# Patient Record
Sex: FEMALE | Race: OTHER | HISPANIC OR LATINO
[De-identification: names, ages, dates, MRNs, and addresses within clinical notes are randomized per-mention and may not be internally consistent; named-entity substitution may affect disease eponyms.]

---

## 2019-05-14 ENCOUNTER — TRANSCRIPTION ENCOUNTER (OUTPATIENT)
Age: 24
End: 2019-05-14

## 2020-05-20 ENCOUNTER — TRANSCRIPTION ENCOUNTER (OUTPATIENT)
Age: 25
End: 2020-05-20

## 2020-08-03 ENCOUNTER — EMERGENCY (EMERGENCY)
Facility: HOSPITAL | Age: 25
LOS: 1 days | Discharge: HOME | End: 2020-08-03
Attending: EMERGENCY MEDICINE
Payer: MEDICAID

## 2020-08-03 VITALS
HEIGHT: 64 IN | DIASTOLIC BLOOD PRESSURE: 81 MMHG | WEIGHT: 125 LBS | RESPIRATION RATE: 16 BRPM | HEART RATE: 88 BPM | OXYGEN SATURATION: 99 % | SYSTOLIC BLOOD PRESSURE: 102 MMHG

## 2020-08-03 VITALS — TEMPERATURE: 98 F

## 2020-08-03 LAB
ALBUMIN SERPL ELPH-MCNC: 4.6 G/DL — SIGNIFICANT CHANGE UP (ref 3.3–5.2)
ALP SERPL-CCNC: 55 U/L — SIGNIFICANT CHANGE UP (ref 40–120)
ALT FLD-CCNC: 15 U/L — SIGNIFICANT CHANGE UP
ANION GAP SERPL CALC-SCNC: 20 MMOL/L — HIGH (ref 5–17)
APPEARANCE UR: CLEAR — SIGNIFICANT CHANGE UP
AST SERPL-CCNC: 29 U/L — SIGNIFICANT CHANGE UP
BACTERIA # UR AUTO: NEGATIVE — SIGNIFICANT CHANGE UP
BASOPHILS # BLD AUTO: 0.02 K/UL — SIGNIFICANT CHANGE UP (ref 0–0.2)
BASOPHILS NFR BLD AUTO: 0.3 % — SIGNIFICANT CHANGE UP (ref 0–2)
BILIRUB SERPL-MCNC: 0.3 MG/DL — LOW (ref 0.4–2)
BILIRUB UR-MCNC: NEGATIVE — SIGNIFICANT CHANGE UP
BUN SERPL-MCNC: 12 MG/DL — SIGNIFICANT CHANGE UP (ref 8–20)
CALCIUM SERPL-MCNC: 10.2 MG/DL — SIGNIFICANT CHANGE UP (ref 8.6–10.2)
CHLORIDE SERPL-SCNC: 98 MMOL/L — SIGNIFICANT CHANGE UP (ref 98–107)
CO2 SERPL-SCNC: 19 MMOL/L — LOW (ref 22–29)
COLOR SPEC: YELLOW — SIGNIFICANT CHANGE UP
CREAT SERPL-MCNC: 0.8 MG/DL — SIGNIFICANT CHANGE UP (ref 0.5–1.3)
DIFF PNL FLD: NEGATIVE — SIGNIFICANT CHANGE UP
EOSINOPHIL # BLD AUTO: 0.01 K/UL — SIGNIFICANT CHANGE UP (ref 0–0.5)
EOSINOPHIL NFR BLD AUTO: 0.2 % — SIGNIFICANT CHANGE UP (ref 0–6)
EPI CELLS # UR: SIGNIFICANT CHANGE UP
GLUCOSE SERPL-MCNC: 170 MG/DL — HIGH (ref 70–99)
GLUCOSE UR QL: NEGATIVE MG/DL — SIGNIFICANT CHANGE UP
HCG SERPL-ACNC: <4 MIU/ML — SIGNIFICANT CHANGE UP
HCT VFR BLD CALC: 44.5 % — SIGNIFICANT CHANGE UP (ref 34.5–45)
HGB BLD-MCNC: 14.5 G/DL — SIGNIFICANT CHANGE UP (ref 11.5–15.5)
IMM GRANULOCYTES NFR BLD AUTO: 0.3 % — SIGNIFICANT CHANGE UP (ref 0–1.5)
KETONES UR-MCNC: ABNORMAL
LEUKOCYTE ESTERASE UR-ACNC: ABNORMAL
LYMPHOCYTES # BLD AUTO: 1.29 K/UL — SIGNIFICANT CHANGE UP (ref 1–3.3)
LYMPHOCYTES # BLD AUTO: 20 % — SIGNIFICANT CHANGE UP (ref 13–44)
MCHC RBC-ENTMCNC: 30.2 PG — SIGNIFICANT CHANGE UP (ref 27–34)
MCHC RBC-ENTMCNC: 32.6 GM/DL — SIGNIFICANT CHANGE UP (ref 32–36)
MCV RBC AUTO: 92.7 FL — SIGNIFICANT CHANGE UP (ref 80–100)
MONOCYTES # BLD AUTO: 0.1 K/UL — SIGNIFICANT CHANGE UP (ref 0–0.9)
MONOCYTES NFR BLD AUTO: 1.6 % — LOW (ref 2–14)
NEUTROPHILS # BLD AUTO: 5 K/UL — SIGNIFICANT CHANGE UP (ref 1.8–7.4)
NEUTROPHILS NFR BLD AUTO: 77.6 % — HIGH (ref 43–77)
NITRITE UR-MCNC: NEGATIVE — SIGNIFICANT CHANGE UP
PH UR: 5 — SIGNIFICANT CHANGE UP (ref 5–8)
PLATELET # BLD AUTO: 295 K/UL — SIGNIFICANT CHANGE UP (ref 150–400)
POTASSIUM SERPL-MCNC: 3.9 MMOL/L — SIGNIFICANT CHANGE UP (ref 3.5–5.3)
POTASSIUM SERPL-SCNC: 3.9 MMOL/L — SIGNIFICANT CHANGE UP (ref 3.5–5.3)
PROT SERPL-MCNC: 8.2 G/DL — SIGNIFICANT CHANGE UP (ref 6.6–8.7)
PROT UR-MCNC: 30 MG/DL
RBC # BLD: 4.8 M/UL — SIGNIFICANT CHANGE UP (ref 3.8–5.2)
RBC # FLD: 12.6 % — SIGNIFICANT CHANGE UP (ref 10.3–14.5)
RBC CASTS # UR COMP ASSIST: SIGNIFICANT CHANGE UP /HPF (ref 0–4)
SODIUM SERPL-SCNC: 137 MMOL/L — SIGNIFICANT CHANGE UP (ref 135–145)
SP GR SPEC: 1.02 — SIGNIFICANT CHANGE UP (ref 1.01–1.02)
UROBILINOGEN FLD QL: 1 MG/DL
WBC # BLD: 6.44 K/UL — SIGNIFICANT CHANGE UP (ref 3.8–10.5)
WBC # FLD AUTO: 6.44 K/UL — SIGNIFICANT CHANGE UP (ref 3.8–10.5)
WBC UR QL: SIGNIFICANT CHANGE UP

## 2020-08-03 PROCEDURE — 99284 EMERGENCY DEPT VISIT MOD MDM: CPT

## 2020-08-03 PROCEDURE — 83690 ASSAY OF LIPASE: CPT

## 2020-08-03 PROCEDURE — 96375 TX/PRO/DX INJ NEW DRUG ADDON: CPT

## 2020-08-03 PROCEDURE — 84702 CHORIONIC GONADOTROPIN TEST: CPT

## 2020-08-03 PROCEDURE — 36415 COLL VENOUS BLD VENIPUNCTURE: CPT

## 2020-08-03 PROCEDURE — 80053 COMPREHEN METABOLIC PANEL: CPT

## 2020-08-03 PROCEDURE — 96361 HYDRATE IV INFUSION ADD-ON: CPT

## 2020-08-03 PROCEDURE — 81001 URINALYSIS AUTO W/SCOPE: CPT

## 2020-08-03 PROCEDURE — 96374 THER/PROPH/DIAG INJ IV PUSH: CPT

## 2020-08-03 PROCEDURE — 99284 EMERGENCY DEPT VISIT MOD MDM: CPT | Mod: 25

## 2020-08-03 PROCEDURE — 85027 COMPLETE CBC AUTOMATED: CPT

## 2020-08-03 RX ORDER — ONDANSETRON 8 MG/1
1 TABLET, FILM COATED ORAL
Qty: 9 | Refills: 0
Start: 2020-08-03 | End: 2020-08-05

## 2020-08-03 RX ORDER — SODIUM CHLORIDE 9 MG/ML
1000 INJECTION INTRAMUSCULAR; INTRAVENOUS; SUBCUTANEOUS ONCE
Refills: 0 | Status: COMPLETED | OUTPATIENT
Start: 2020-08-03 | End: 2020-08-03

## 2020-08-03 RX ORDER — SODIUM CHLORIDE 9 MG/ML
1000 INJECTION INTRAMUSCULAR; INTRAVENOUS; SUBCUTANEOUS ONCE
Refills: 0 | Status: DISCONTINUED | OUTPATIENT
Start: 2020-08-03 | End: 2020-08-03

## 2020-08-03 RX ORDER — KETOROLAC TROMETHAMINE 30 MG/ML
30 SYRINGE (ML) INJECTION ONCE
Refills: 0 | Status: DISCONTINUED | OUTPATIENT
Start: 2020-08-03 | End: 2020-08-03

## 2020-08-03 RX ORDER — ONDANSETRON 8 MG/1
4 TABLET, FILM COATED ORAL ONCE
Refills: 0 | Status: COMPLETED | OUTPATIENT
Start: 2020-08-03 | End: 2020-08-03

## 2020-08-03 RX ADMIN — ONDANSETRON 4 MILLIGRAM(S): 8 TABLET, FILM COATED ORAL at 09:14

## 2020-08-03 RX ADMIN — SODIUM CHLORIDE 1000 MILLILITER(S): 9 INJECTION INTRAMUSCULAR; INTRAVENOUS; SUBCUTANEOUS at 09:16

## 2020-08-03 RX ADMIN — SODIUM CHLORIDE 1000 MILLILITER(S): 9 INJECTION INTRAMUSCULAR; INTRAVENOUS; SUBCUTANEOUS at 10:17

## 2020-08-03 RX ADMIN — Medication 30 MILLIGRAM(S): at 10:26

## 2020-08-03 NOTE — ED PROVIDER NOTE - NONTENDER LOCATION
periumbilical/left costovertebral angle/left upper quadrant/right upper quadrant/umbilical/suprapubic/right costovertebral angle

## 2020-08-03 NOTE — ED PROVIDER NOTE - CLINICAL SUMMARY MEDICAL DECISION MAKING FREE TEXT BOX
pt with hx of abx pain, similar to previous abd pain. will do labs, lipase, ivf, pain control, zofran and re-eval

## 2020-08-03 NOTE — ED PROVIDER NOTE - OBJECTIVE STATEMENT
pt is a 25yo female with pmhx of anxiety presents with abd pain x today. pt reports acute onset sharp umbilical abd pain without radiation with multiple episodes of NBNB vomiting with diarrhea. pt reports she gets abd pain at  baseline,  has been worked up multiple times by gi without acute findings (unsure what gi pt is a 25yo female with pmhx of anxiety presents with abd pain x today. pt reports acute onset sharp umbilical abd pain without radiation with multiple episodes of NBNB vomiting with diarrhea. pt reports she gets abd pain at  baseline,  has been worked up multiple times by gi without acute findings (unsure what GI). pt reports pain similar to "usual" abd pain. pt did not take anything for symptoms. pt denies fever, cp, sob, dysuria, vaginal bleeding, cough, sick contacts, recent travel. lmp 1 week ago

## 2020-08-03 NOTE — ED PROVIDER NOTE - PATIENT PORTAL LINK FT
You can access the FollowMyHealth Patient Portal offered by [facility] by registering at the following website: [email or web address]. By joining [employer]’s FollowMyHealth portal, you will also be able to view your health information using other applications (apps) compatible with our system.

## 2020-08-03 NOTE — ED PROVIDER NOTE - CARE PROVIDER_API CALL
Ana Briceno  GASTROENTEROLOGY  [address] [place] [address]  Phone: [phone]  Fax: [phone]  Follow Up Time:

## 2020-08-03 NOTE — ED ADULT NURSE NOTE - OBJECTIVE STATEMENT
pt began having mid epigastric pain associated w N/V x 1 hour.  per pt just finished her period denies fevers sweats chills denies chest pain denies difficulty breathing denies SOB, denies ROLAND

## 2020-08-03 NOTE — ED PROVIDER NOTE - PROGRESS NOTE DETAILS
pt improved. abd soft,nontender. labs reviewed. will po trial and re-eval pt improved. tolerated po crackers and soda without issue. pt advised to follow up with GI. will rx zofran. All  labs reviewed. all results reviewed with pt including abnormal results. pt given a copy of results. pt advised to follow up with pmd regarding abnormal results. All questions answered and concerns addressed. pt verbalized understanding and agreement with plan and dx. pt advised on next step and when/where to follow up. pt advised on all take home and otc medications. pt advised to follow up with PMD. pt advised to return to ed for worsenng symptoms including fever, cp, sob. will dc.

## 2020-08-03 NOTE — ED ADULT TRIAGE NOTE - CHIEF COMPLAINT QUOTE
Sharp upper abdominal pain that began this morning with vomiting and diarrhea.  Denies fever or chills.

## 2020-08-03 NOTE — ED PROVIDER NOTE - ATTENDING CONTRIBUTION TO CARE
seen with ACP  Pt with abd pain nvd  pe as documented  improved after meds with normal labs  agree with care and dispo

## 2020-10-13 NOTE — ED ADULT TRIAGE NOTE - MEANS OF ARRIVAL
Rosalia Banda(spouse) calling to get all of patient’s medications transferred from [facility] to [facility] with three 90 day refills on them. Said did not need any of these refilled at this time (will not need until another 20 days), just calling to transfer.     Callback# [phone]    stretcher

## 2020-11-16 ENCOUNTER — EMERGENCY (EMERGENCY)
Facility: HOSPITAL | Age: 25
LOS: 1 days | Discharge: HOME | End: 2020-11-16
Attending: EMERGENCY MEDICINE
Payer: MEDICAID

## 2020-11-16 VITALS
OXYGEN SATURATION: 98 % | RESPIRATION RATE: 18 BRPM | SYSTOLIC BLOOD PRESSURE: 129 MMHG | HEART RATE: 104 BPM | HEIGHT: 64 IN | TEMPERATURE: 98 F | WEIGHT: 130.07 LBS | DIASTOLIC BLOOD PRESSURE: 82 MMHG

## 2020-11-16 LAB
BASOPHILS # BLD AUTO: 0.01 K/UL — SIGNIFICANT CHANGE UP (ref 0–0.2)
BASOPHILS NFR BLD AUTO: 0.2 % — SIGNIFICANT CHANGE UP (ref 0–2)
EOSINOPHIL # BLD AUTO: 0.01 K/UL — SIGNIFICANT CHANGE UP (ref 0–0.5)
EOSINOPHIL NFR BLD AUTO: 0.2 % — SIGNIFICANT CHANGE UP (ref 0–6)
HCT VFR BLD CALC: 38.4 % — SIGNIFICANT CHANGE UP (ref 34.5–45)
HGB BLD-MCNC: 12.7 G/DL — SIGNIFICANT CHANGE UP (ref 11.5–15.5)
IMM GRANULOCYTES NFR BLD AUTO: 0.3 % — SIGNIFICANT CHANGE UP (ref 0–1.5)
LYMPHOCYTES # BLD AUTO: 1 K/UL — SIGNIFICANT CHANGE UP (ref 1–3.3)
LYMPHOCYTES # BLD AUTO: 17 % — SIGNIFICANT CHANGE UP (ref 13–44)
MCHC RBC-ENTMCNC: 30.5 PG — SIGNIFICANT CHANGE UP (ref 27–34)
MCHC RBC-ENTMCNC: 33.1 GM/DL — SIGNIFICANT CHANGE UP (ref 32–36)
MCV RBC AUTO: 92.1 FL — SIGNIFICANT CHANGE UP (ref 80–100)
MONOCYTES # BLD AUTO: 0.43 K/UL — SIGNIFICANT CHANGE UP (ref 0–0.9)
MONOCYTES NFR BLD AUTO: 7.3 % — SIGNIFICANT CHANGE UP (ref 2–14)
NEUTROPHILS # BLD AUTO: 4.4 K/UL — SIGNIFICANT CHANGE UP (ref 1.8–7.4)
NEUTROPHILS NFR BLD AUTO: 75 % — SIGNIFICANT CHANGE UP (ref 43–77)
PLATELET # BLD AUTO: 253 K/UL — SIGNIFICANT CHANGE UP (ref 150–400)
RBC # BLD: 4.17 M/UL — SIGNIFICANT CHANGE UP (ref 3.8–5.2)
RBC # FLD: 13 % — SIGNIFICANT CHANGE UP (ref 10.3–14.5)
WBC # BLD: 5.87 K/UL — SIGNIFICANT CHANGE UP (ref 3.8–10.5)
WBC # FLD AUTO: 5.87 K/UL — SIGNIFICANT CHANGE UP (ref 3.8–10.5)

## 2020-11-16 PROCEDURE — 99285 EMERGENCY DEPT VISIT HI MDM: CPT

## 2020-11-16 RX ORDER — IOHEXOL 300 MG/ML
30 INJECTION, SOLUTION INTRAVENOUS ONCE
Refills: 0 | Status: DISCONTINUED | OUTPATIENT
Start: 2020-11-16 | End: 2020-11-17

## 2020-11-16 RX ORDER — KETOROLAC TROMETHAMINE 30 MG/ML
15 SYRINGE (ML) INJECTION ONCE
Refills: 0 | Status: DISCONTINUED | OUTPATIENT
Start: 2020-11-16 | End: 2020-11-16

## 2020-11-16 RX ORDER — ONDANSETRON 8 MG/1
4 TABLET, FILM COATED ORAL ONCE
Refills: 0 | Status: COMPLETED | OUTPATIENT
Start: 2020-11-16 | End: 2020-11-16

## 2020-11-16 RX ORDER — MORPHINE SULFATE 50 MG/1
2 CAPSULE, EXTENDED RELEASE ORAL ONCE
Refills: 0 | Status: DISCONTINUED | OUTPATIENT
Start: 2020-11-16 | End: 2020-11-16

## 2020-11-16 RX ORDER — SODIUM CHLORIDE 9 MG/ML
1000 INJECTION, SOLUTION INTRAVENOUS ONCE
Refills: 0 | Status: COMPLETED | OUTPATIENT
Start: 2020-11-16 | End: 2020-11-16

## 2020-11-16 RX ADMIN — SODIUM CHLORIDE 1000 MILLILITER(S): 9 INJECTION, SOLUTION INTRAVENOUS at 23:48

## 2020-11-16 RX ADMIN — ONDANSETRON 4 MILLIGRAM(S): 8 TABLET, FILM COATED ORAL at 23:48

## 2020-11-16 NOTE — ED ADULT NURSE NOTE - CAS ELECT INFOMATION PROVIDED
DC instructions/DC instructions reviewed by SANDRA Nolasco. Pt with no apparent acute distress observed at time of discharge, ambulatory to exit. Safety maintained in ED.

## 2020-11-16 NOTE — ED ADULT TRIAGE NOTE - CHIEF COMPLAINT QUOTE
Patient is alert and oriented x3 c/o lower abd pain with n/v/d x1 day. denies fevers. denies chest pain or shortness of breath.

## 2020-11-16 NOTE — ED PROVIDER NOTE - OBJECTIVE STATEMENT
PT with no SPMHx presents to the ED with complaint of lower abd pain since this Am. Pt states that she woke up with pain that has been constat since onset. Pt states that pain is moderate feels like a dull ache that is non radiating, not improved or made worse by anything. Pt admits to interment N/V, bloody mucous diarrhea Pt states that she took Pepto at home that did not help. Pt dines fever, chills, weakness, numbness,, tingling, back pain, HA, SOB, CP, diff breathing, anal sex.

## 2020-11-16 NOTE — ED PROVIDER NOTE - CARE PROVIDER_API CALL
Moy Garcia  GASTROENTEROLOGY  [address] [place] [address]  Phone: [phone]  Fax: [phone]  Follow Up Time:

## 2020-11-16 NOTE — ED PROVIDER NOTE - CLINICAL SUMMARY MEDICAL DECISION MAKING FREE TEXT BOX
abd soft nt now nd supporitve care for diarrhea return precatutions given to pt BRAT diet advised PT with stable VS, no acute distress, non toxic appearing, tolerating PO in the ED, sig clinical improvement of symptoms after conservative medical intervention, Pt to be dc home with supportive care, BRAT diet advised, educated about when to return to the ED if needed. PT verbalizes that he understands all instructions and results. Pt informed that ED is open and available 24/7 365 days a yr, encouraged to return to the ED if they have any change in condition, or feel the need for revaluation.

## 2020-11-16 NOTE — ED PROVIDER NOTE - PATIENT PORTAL LINK FT
You can access the FollowMyHealth Patient Portal offered by [facility] by registering at the following website: [email or web address]. By joining [employer]’s FollowMyHealth portal, you will also be able to view your health information using other applications (apps) compatible with our system.

## 2020-11-16 NOTE — ED ADULT NURSE NOTE - OBJECTIVE STATEMENT
Pt received with reports of N/V/D since this morning. Pt reporting dhara red blood in stools since this morning. Pt received awake, alert, oriented x4. Respirations appearing even, unlabored.  Skin warm, dry, appropriate for race. Abdomen soft. Pt with no apparent acute distress observed at this time. Safety maintained, will continue to monitor.

## 2020-11-16 NOTE — ED PROVIDER NOTE - NS ED ROS FT
ROS: CONTUSIONAL: Denies fever, chills, fatigue, wt loss. HEAD: Denies trauma, HA, Dizziness. EYE: Denies Acute visual changes, diplopia. ENMT: Denies change in hearing, tinnitus, epistaxis, difficulty swallowing, sore throat. CARDIO: Denies CP, palpitations, edema. RESP: Denies Cough, SOB , Diff breathing, hemoptysis. GI: + N/V, ABD pain, change in bowel movement. URINARY: Denies difficulty urinating, pelvic pain. MS:  Denies joint pain, back pain, weakness, decreased ROM, swelling. NEURO: Denies change in gait, seizures, loss of sensation, dizziness, confusion LOC.  PSY: NO SI/HI.

## 2020-11-16 NOTE — ED PROVIDER NOTE - ADDITIONAL NOTES AND INSTRUCTIONS:
PT was evaluated At [facility] ED and was found to have a condition that warranted time of to rest and heal from WORK/SCHOOL.   Beau Nolasco PA-C

## 2020-11-17 LAB
ALBUMIN SERPL ELPH-MCNC: 3.8 G/DL — SIGNIFICANT CHANGE UP (ref 3.3–5.2)
ALP SERPL-CCNC: 37 U/L — LOW (ref 40–120)
ALT FLD-CCNC: 11 U/L — SIGNIFICANT CHANGE UP
ANION GAP SERPL CALC-SCNC: 13 MMOL/L — SIGNIFICANT CHANGE UP (ref 5–17)
APTT BLD: 26.3 SEC — LOW (ref 27.5–35.5)
AST SERPL-CCNC: 29 U/L — SIGNIFICANT CHANGE UP
BILIRUB SERPL-MCNC: 0.3 MG/DL — LOW (ref 0.4–2)
BLD GP AB SCN SERPL QL: SIGNIFICANT CHANGE UP
BUN SERPL-MCNC: 13 MG/DL — SIGNIFICANT CHANGE UP (ref 8–20)
CALCIUM SERPL-MCNC: 8.8 MG/DL — SIGNIFICANT CHANGE UP (ref 8.6–10.2)
CHLORIDE SERPL-SCNC: 104 MMOL/L — SIGNIFICANT CHANGE UP (ref 98–107)
CO2 SERPL-SCNC: 19 MMOL/L — LOW (ref 22–29)
CREAT SERPL-MCNC: 0.67 MG/DL — SIGNIFICANT CHANGE UP (ref 0.5–1.3)
GLUCOSE SERPL-MCNC: 116 MG/DL — HIGH (ref 70–99)
HCG SERPL-ACNC: <4 MIU/ML — SIGNIFICANT CHANGE UP
INR BLD: 1.15 RATIO — SIGNIFICANT CHANGE UP (ref 0.88–1.16)
LIDOCAIN IGE QN: 22 U/L — SIGNIFICANT CHANGE UP (ref 22–51)
POTASSIUM SERPL-MCNC: 4.4 MMOL/L — SIGNIFICANT CHANGE UP (ref 3.5–5.3)
POTASSIUM SERPL-SCNC: 4.4 MMOL/L — SIGNIFICANT CHANGE UP (ref 3.5–5.3)
PROT SERPL-MCNC: 7.4 G/DL — SIGNIFICANT CHANGE UP (ref 6.6–8.7)
PROTHROM AB SERPL-ACNC: 13.3 SEC — SIGNIFICANT CHANGE UP (ref 10.6–13.6)
SODIUM SERPL-SCNC: 136 MMOL/L — SIGNIFICANT CHANGE UP (ref 135–145)

## 2020-11-17 PROCEDURE — 86900 BLOOD TYPING SEROLOGIC ABO: CPT

## 2020-11-17 PROCEDURE — 80053 COMPREHEN METABOLIC PANEL: CPT

## 2020-11-17 PROCEDURE — 96376 TX/PRO/DX INJ SAME DRUG ADON: CPT

## 2020-11-17 PROCEDURE — 86850 RBC ANTIBODY SCREEN: CPT

## 2020-11-17 PROCEDURE — 36415 COLL VENOUS BLD VENIPUNCTURE: CPT

## 2020-11-17 PROCEDURE — 85730 THROMBOPLASTIN TIME PARTIAL: CPT

## 2020-11-17 PROCEDURE — 85025 COMPLETE CBC W/AUTO DIFF WBC: CPT

## 2020-11-17 PROCEDURE — 96374 THER/PROPH/DIAG INJ IV PUSH: CPT

## 2020-11-17 PROCEDURE — 96375 TX/PRO/DX INJ NEW DRUG ADDON: CPT

## 2020-11-17 PROCEDURE — 83690 ASSAY OF LIPASE: CPT

## 2020-11-17 PROCEDURE — 86901 BLOOD TYPING SEROLOGIC RH(D): CPT

## 2020-11-17 PROCEDURE — 84702 CHORIONIC GONADOTROPIN TEST: CPT

## 2020-11-17 PROCEDURE — 99284 EMERGENCY DEPT VISIT MOD MDM: CPT | Mod: 25

## 2020-11-17 PROCEDURE — 85610 PROTHROMBIN TIME: CPT

## 2020-11-17 RX ORDER — ONDANSETRON 8 MG/1
1 TABLET, FILM COATED ORAL
Qty: 16 | Refills: 0
Start: 2020-11-17 | End: 2020-11-20

## 2020-11-17 RX ORDER — IBUPROFEN 200 MG
1 TABLET ORAL
Qty: 20 | Refills: 0
Start: 2020-11-17 | End: 2020-11-21

## 2020-11-17 RX ORDER — FAMOTIDINE 10 MG/ML
1 INJECTION INTRAVENOUS
Qty: 14 | Refills: 0
Start: 2020-11-17 | End: 2020-11-30

## 2020-11-17 RX ORDER — LIDOCAINE 4 G/100G
10 CREAM TOPICAL ONCE
Refills: 0 | Status: DISCONTINUED | OUTPATIENT
Start: 2020-11-17 | End: 2020-11-21

## 2020-11-17 RX ORDER — ONDANSETRON 8 MG/1
4 TABLET, FILM COATED ORAL ONCE
Refills: 0 | Status: COMPLETED | OUTPATIENT
Start: 2020-11-17 | End: 2020-11-17

## 2020-11-17 RX ADMIN — Medication 15 MILLIGRAM(S): at 00:41

## 2020-11-17 RX ADMIN — ONDANSETRON 4 MILLIGRAM(S): 8 TABLET, FILM COATED ORAL at 00:41

## 2020-11-17 RX ADMIN — MORPHINE SULFATE 2 MILLIGRAM(S): 50 CAPSULE, EXTENDED RELEASE ORAL at 00:41

## 2020-11-17 RX ADMIN — Medication 30 MILLILITER(S): at 00:41

## 2022-03-24 NOTE — ED ADULT TRIAGE NOTE - BSA (M2)
What Type Of Note Output Would You Prefer (Optional)?: Bullet Format Has Your Skin Lesion Been Treated?: not been treated Is This A New Presentation, Or A Follow-Up?: Skin Lesion 1.6

## 2022-05-25 ENCOUNTER — APPOINTMENT (OUTPATIENT)
Dept: RHEUMATOLOGY | Facility: CLINIC | Age: 27
End: 2022-05-25
Payer: COMMERCIAL

## 2022-05-25 VITALS
SYSTOLIC BLOOD PRESSURE: 124 MMHG | BODY MASS INDEX: 19.97 KG/M2 | DIASTOLIC BLOOD PRESSURE: 58 MMHG | TEMPERATURE: 99.3 F | HEIGHT: 64 IN | HEART RATE: 92 BPM | OXYGEN SATURATION: 100 % | WEIGHT: 117 LBS

## 2022-05-25 PROCEDURE — 99205 OFFICE O/P NEW HI 60 MIN: CPT

## 2022-05-26 RX ORDER — METOPROLOL SUCCINATE 25 MG/1
25 TABLET, EXTENDED RELEASE ORAL DAILY
Refills: 0 | Status: ACTIVE | COMMUNITY

## 2022-05-26 NOTE — ASSESSMENT
[FreeTextEntry1] : +JADE in the setting of chronic polyarthralgias/ joint stiffness, fatigue, leukopenia, longstanding lymphadenopathy- unclear etiology. Also reports to have recent hair loss, raynauds to BL hands/ feet\par \par - labs as below\par - NSAIDS/Tylenol prn for pain (reviewed risk and benefits of medications)\par - Life style modifications reviewed. Maintain warm core body temperature. Educated about nonpharmacologic measures for treatment of raynauds. Hand/feet protection. Can consider low dose CCB if symptoms get worse.\par \par Discussed treatment plan with the patient. The patient was given the opportunity to ask questions and all questions were answered to their satisfaction.\par

## 2022-05-26 NOTE — HISTORY OF PRESENT ILLNESS
[FreeTextEntry1] : 26 year old female with PMH as listed below presents today for an initial evaluation\par \par Outside records/ labs reviewed. \par Found to have +JADE in the setting of chronic polyarthralgias, fatigue. \par She is following hem/onc for chronic hx of leukopenia, longstanding lymphadenopathy- unclear etiology. \par \par Recently over the last few months her joint pain has increased. She has pain/stiffness to her joint<<wrists, hands. Symptoms worse in AM and better with activity. \par denies swelling to the joints. \par Currently taking NSAIDs/ Tylenol prn for pain with improvement in pain. \par Denies recent illness, tick bites, trauma. \par \par +dry eyes\par +CP- related to anxiety\par +raynauds to BL hands/ feet\par +hair loss\par \par denies fever, chills,oral ulcers, blood in the urine, denies rashes, photosensitivity, denies blood clots\par \par FH: unknown\par NO hx of miscarriages. No kids\par \par

## 2022-05-26 NOTE — REVIEW OF SYSTEMS
Please send pt rx for Ciclopirox to [facility] for a PA.     [As Noted in HPI] : as noted in HPI [Negative] : Heme/Lymph

## 2022-07-20 ENCOUNTER — APPOINTMENT (OUTPATIENT)
Dept: RHEUMATOLOGY | Facility: CLINIC | Age: 27
End: 2022-07-20

## 2022-09-12 ENCOUNTER — EMERGENCY (EMERGENCY)
Facility: HOSPITAL | Age: 27
LOS: 1 days | Discharge: HOME | End: 2022-09-12
Attending: EMERGENCY MEDICINE
Payer: COMMERCIAL

## 2022-09-12 VITALS
HEIGHT: 64 IN | WEIGHT: 125.22 LBS | TEMPERATURE: 98 F | SYSTOLIC BLOOD PRESSURE: 136 MMHG | OXYGEN SATURATION: 99 % | DIASTOLIC BLOOD PRESSURE: 89 MMHG | HEART RATE: 83 BPM | RESPIRATION RATE: 18 BRPM

## 2022-09-12 LAB
ALBUMIN SERPL ELPH-MCNC: 1.4 G/DL — LOW (ref 3.3–5.2)
ALP SERPL-CCNC: 70 U/L — SIGNIFICANT CHANGE UP (ref 40–120)
ALT FLD-CCNC: 12 U/L — SIGNIFICANT CHANGE UP
ANION GAP SERPL CALC-SCNC: 7 MMOL/L — SIGNIFICANT CHANGE UP (ref 5–17)
ANISOCYTOSIS BLD QL: SLIGHT — SIGNIFICANT CHANGE UP
AST SERPL-CCNC: 28 U/L — SIGNIFICANT CHANGE UP
BASOPHILS # BLD AUTO: 0 K/UL — SIGNIFICANT CHANGE UP (ref 0–0.2)
BASOPHILS NFR BLD AUTO: 0 % — SIGNIFICANT CHANGE UP (ref 0–2)
BILIRUB SERPL-MCNC: <0.2 MG/DL — LOW (ref 0.4–2)
BUN SERPL-MCNC: 14.2 MG/DL — SIGNIFICANT CHANGE UP (ref 8–20)
CALCIUM SERPL-MCNC: 7.4 MG/DL — LOW (ref 8.4–10.5)
CHLORIDE SERPL-SCNC: 108 MMOL/L — HIGH (ref 98–107)
CO2 SERPL-SCNC: 22 MMOL/L — SIGNIFICANT CHANGE UP (ref 22–29)
CREAT SERPL-MCNC: 0.94 MG/DL — SIGNIFICANT CHANGE UP (ref 0.5–1.3)
EGFR: 86 ML/MIN/1.73M2 — SIGNIFICANT CHANGE UP
ELLIPTOCYTES BLD QL SMEAR: SIGNIFICANT CHANGE UP
EOSINOPHIL # BLD AUTO: 0.02 K/UL — SIGNIFICANT CHANGE UP (ref 0–0.5)
EOSINOPHIL NFR BLD AUTO: 0.8 % — SIGNIFICANT CHANGE UP (ref 0–6)
GLUCOSE SERPL-MCNC: 89 MG/DL — SIGNIFICANT CHANGE UP (ref 70–99)
HCG SERPL-ACNC: <4 MIU/ML — SIGNIFICANT CHANGE UP
HCT VFR BLD CALC: 32.4 % — LOW (ref 34.5–45)
HGB BLD-MCNC: 10.4 G/DL — LOW (ref 11.5–15.5)
LYMPHOCYTES # BLD AUTO: 0.67 K/UL — LOW (ref 1–3.3)
LYMPHOCYTES # BLD AUTO: 24.8 % — SIGNIFICANT CHANGE UP (ref 13–44)
MANUAL SMEAR VERIFICATION: SIGNIFICANT CHANGE UP
MCHC RBC-ENTMCNC: 30.1 PG — SIGNIFICANT CHANGE UP (ref 27–34)
MCHC RBC-ENTMCNC: 32.1 GM/DL — SIGNIFICANT CHANGE UP (ref 32–36)
MCV RBC AUTO: 93.6 FL — SIGNIFICANT CHANGE UP (ref 80–100)
MONOCYTES # BLD AUTO: 0.12 K/UL — SIGNIFICANT CHANGE UP (ref 0–0.9)
MONOCYTES NFR BLD AUTO: 4.3 % — SIGNIFICANT CHANGE UP (ref 2–14)
NEUTROPHILS # BLD AUTO: 1.85 K/UL — SIGNIFICANT CHANGE UP (ref 1.8–7.4)
NEUTROPHILS NFR BLD AUTO: 68.4 % — SIGNIFICANT CHANGE UP (ref 43–77)
OVALOCYTES BLD QL SMEAR: SIGNIFICANT CHANGE UP
PLAT MORPH BLD: NORMAL — SIGNIFICANT CHANGE UP
PLATELET # BLD AUTO: 70 K/UL — LOW (ref 150–400)
POIKILOCYTOSIS BLD QL AUTO: SIGNIFICANT CHANGE UP
POTASSIUM SERPL-MCNC: 4.7 MMOL/L — SIGNIFICANT CHANGE UP (ref 3.5–5.3)
POTASSIUM SERPL-SCNC: 4.7 MMOL/L — SIGNIFICANT CHANGE UP (ref 3.5–5.3)
PROT SERPL-MCNC: 4.9 G/DL — LOW (ref 6.6–8.7)
RBC # BLD: 3.46 M/UL — LOW (ref 3.8–5.2)
RBC # FLD: 14.9 % — HIGH (ref 10.3–14.5)
RBC BLD AUTO: ABNORMAL
SMUDGE CELLS # BLD: PRESENT — SIGNIFICANT CHANGE UP
SODIUM SERPL-SCNC: 137 MMOL/L — SIGNIFICANT CHANGE UP (ref 135–145)
VARIANT LYMPHS # BLD: 1.7 % — SIGNIFICANT CHANGE UP (ref 0–6)
WBC # BLD: 2.7 K/UL — LOW (ref 3.8–10.5)
WBC # FLD AUTO: 2.7 K/UL — LOW (ref 3.8–10.5)

## 2022-09-12 PROCEDURE — 99284 EMERGENCY DEPT VISIT MOD MDM: CPT

## 2022-09-12 PROCEDURE — 85025 COMPLETE CBC W/AUTO DIFF WBC: CPT

## 2022-09-12 PROCEDURE — 80053 COMPREHEN METABOLIC PANEL: CPT

## 2022-09-12 PROCEDURE — 99283 EMERGENCY DEPT VISIT LOW MDM: CPT

## 2022-09-12 PROCEDURE — 36415 COLL VENOUS BLD VENIPUNCTURE: CPT

## 2022-09-12 PROCEDURE — 84702 CHORIONIC GONADOTROPIN TEST: CPT

## 2022-09-12 RX ORDER — ACETAMINOPHEN 500 MG
650 TABLET ORAL ONCE
Refills: 0 | Status: COMPLETED | OUTPATIENT
Start: 2022-09-12 | End: 2022-09-12

## 2022-09-12 RX ADMIN — Medication 650 MILLIGRAM(S): at 09:18

## 2022-09-12 RX ADMIN — Medication 50 MILLIGRAM(S): at 09:18

## 2022-09-12 NOTE — ED STATDOCS - NS ED ROS FT
ROS: (+) joint pain, swelling; No fever/chills. No eye pain/changes in vision, No ear pain/sore throat/dysphagia, No chest pain/palpitations. No SOB/cough/. No abdominal pain, N/V/D, no black/bloody bm. No dysuria/frequency/discharge, No headache. No Dizziness.    No rashes or breaks in skin. No numbness/tingling/weakness.

## 2022-09-12 NOTE — ED STATDOCS - CLINICAL SUMMARY MEDICAL DECISION MAKING FREE TEXT BOX
Patient with suspected autoimmune disease with continued pain and discomfort. Will check labs, treat with steroids and Tylenol, and likely D/C with short course of steroids until she sees rheumatologist on Wednesday.

## 2022-09-12 NOTE — ED STATDOCS - PHYSICAL EXAMINATION
Gen: No acute distress, non toxic  HEENT: Mucous membranes moist, pink conjunctivae, EOMI  CV: RRR, nl s1/s2.  Resp: CTAB, normal rate and effort  GI: Abdomen soft, NT, ND. No rebound, no guarding  : No CVAT  Neuro: A&O x 3, moving all 4 extremities  MSK: No spine or joint tenderness to palpation  Skin: No rashes. intact and perfused. (+) swelling to face and fingers

## 2022-09-12 NOTE — ED STATDOCS - OBJECTIVE STATEMENT
25 y/o female with no PMHx presents with several weeks of diffuse joint pain and swelling, including swelling to her face, with low grade fever several weeks ago. Currently being worked up by Rheumatologist for autoimmune disease/Lupus, currently not on medications, but has lab work with her, has appointment on Wednesday to go over the labs. Was told she has abnormal kidney function. Was taking Meloxicam, but was told to stop taking it due to her kidneys. Labs (+) for elevated ESR, (+) JADE, and double stranded DNA antibodies.

## 2022-09-12 NOTE — ED STATDOCS - ATTENDING APP SHARED VISIT CONTRIBUTION OF CARE
Kenneth: I performed a face to face bedside interview with patient regarding history of present illness, review of symptoms and past medical history. I completed an independent physical exam and ordered tests/medications as needed.  I have discussed patient's plan of care with advanced care provider. The advanced care provider assisted in  executing the discussed plan. I was available for any questions or issues that may have arose during the execution of the plan of care.

## 2022-09-12 NOTE — ED STATDOCS - PATIENT PORTAL LINK FT
You can access the FollowMyHealth Patient Portal offered by [facility] by registering at the following website: [email or web address]. By joining [employer]’s FollowMyHealth portal, you will also be able to view your health information using other applications (apps) compatible with our system.

## 2022-09-12 NOTE — ED STATDOCS - NSFOLLOWUPINSTRUCTIONS_ED_ALL_ED_FT
Follow up with your rheumatologist this wednesday as scheduled and take the prednisone that was prescribed to you.     Please follow up with your Primary Care Doctor in 1 - 2 days. If you cannot follow-up with your primary care doctor please return to the Emergency Department for any urgent issues.  - Seek immediate medical care for any new, worsening or concerning signs or symptoms.     - If you have difficulty following up, please call: [phone] ([phone] or go to [email or web address] to obtain a [facility] doctor or specialist who takes your insurance in your area.    Continue all previously prescribed medications as directed. You can use Tylenol 650 mg every 4 hours for pain/fever. Follow up with your primary care physician in 48-72 hours- bring copies of your results.  Return to the emergency department for chest pain, shortness of breath, dizziness, or worsening, concerning, or persistent symptoms.

## 2022-09-12 NOTE — ED STATDOCS - NS ED ATTENDING STATEMENT MOD
This was a shared visit with the HEIDI. I reviewed and verified the documentation and independently performed the documented: Attending Only

## 2022-09-12 NOTE — ED ADULT TRIAGE NOTE - CHIEF COMPLAINT QUOTE
pt c/o joint pain & body aches, swelling ti face, going on for a few weeks, being worked up for LUPUS  A&Ox3, resp wnl

## 2022-09-16 ENCOUNTER — EMERGENCY (EMERGENCY)
Facility: HOSPITAL | Age: 27
LOS: 1 days | Discharge: HOME | End: 2022-09-16
Attending: EMERGENCY MEDICINE
Payer: COMMERCIAL

## 2022-09-16 VITALS
HEART RATE: 103 BPM | DIASTOLIC BLOOD PRESSURE: 68 MMHG | RESPIRATION RATE: 16 BRPM | OXYGEN SATURATION: 98 % | SYSTOLIC BLOOD PRESSURE: 132 MMHG | WEIGHT: 119.93 LBS | HEIGHT: 64 IN | TEMPERATURE: 98 F

## 2022-09-16 LAB
ALBUMIN SERPL ELPH-MCNC: 1.7 G/DL — LOW (ref 3.3–5.2)
ALP SERPL-CCNC: 81 U/L — SIGNIFICANT CHANGE UP (ref 40–120)
ALT FLD-CCNC: 23 U/L — SIGNIFICANT CHANGE UP
ANION GAP SERPL CALC-SCNC: 8 MMOL/L — SIGNIFICANT CHANGE UP (ref 5–17)
ANISOCYTOSIS BLD QL: SLIGHT — SIGNIFICANT CHANGE UP
AST SERPL-CCNC: 50 U/L — HIGH
BASOPHILS # BLD AUTO: 0 K/UL — SIGNIFICANT CHANGE UP (ref 0–0.2)
BASOPHILS NFR BLD AUTO: 0 % — SIGNIFICANT CHANGE UP (ref 0–2)
BILIRUB SERPL-MCNC: <0.2 MG/DL — LOW (ref 0.4–2)
BUN SERPL-MCNC: 22.4 MG/DL — HIGH (ref 8–20)
BURR CELLS BLD QL SMEAR: PRESENT — SIGNIFICANT CHANGE UP
CALCIUM SERPL-MCNC: 7.7 MG/DL — LOW (ref 8.4–10.5)
CHLORIDE SERPL-SCNC: 102 MMOL/L — SIGNIFICANT CHANGE UP (ref 98–107)
CO2 SERPL-SCNC: 22 MMOL/L — SIGNIFICANT CHANGE UP (ref 22–29)
CREAT SERPL-MCNC: 0.88 MG/DL — SIGNIFICANT CHANGE UP (ref 0.5–1.3)
EGFR: 93 ML/MIN/1.73M2 — SIGNIFICANT CHANGE UP
EOSINOPHIL # BLD AUTO: 0 K/UL — SIGNIFICANT CHANGE UP (ref 0–0.5)
EOSINOPHIL NFR BLD AUTO: 0 % — SIGNIFICANT CHANGE UP (ref 0–6)
GIANT PLATELETS BLD QL SMEAR: PRESENT — SIGNIFICANT CHANGE UP
GLUCOSE SERPL-MCNC: 152 MG/DL — HIGH (ref 70–99)
HCT VFR BLD CALC: 34.4 % — LOW (ref 34.5–45)
HGB BLD-MCNC: 11.1 G/DL — LOW (ref 11.5–15.5)
LYMPHOCYTES # BLD AUTO: 0.14 K/UL — LOW (ref 1–3.3)
LYMPHOCYTES # BLD AUTO: 4.4 % — LOW (ref 13–44)
MACROCYTES BLD QL: SLIGHT — SIGNIFICANT CHANGE UP
MANUAL SMEAR VERIFICATION: SIGNIFICANT CHANGE UP
MCHC RBC-ENTMCNC: 29.8 PG — SIGNIFICANT CHANGE UP (ref 27–34)
MCHC RBC-ENTMCNC: 32.3 GM/DL — SIGNIFICANT CHANGE UP (ref 32–36)
MCV RBC AUTO: 92.5 FL — SIGNIFICANT CHANGE UP (ref 80–100)
MICROCYTES BLD QL: SLIGHT — SIGNIFICANT CHANGE UP
MONOCYTES # BLD AUTO: 0.12 K/UL — SIGNIFICANT CHANGE UP (ref 0–0.9)
MONOCYTES NFR BLD AUTO: 3.5 % — SIGNIFICANT CHANGE UP (ref 2–14)
NEUTROPHILS # BLD AUTO: 3 K/UL — SIGNIFICANT CHANGE UP (ref 1.8–7.4)
NEUTROPHILS NFR BLD AUTO: 91.2 % — HIGH (ref 43–77)
OVALOCYTES BLD QL SMEAR: SIGNIFICANT CHANGE UP
PLAT MORPH BLD: NORMAL — SIGNIFICANT CHANGE UP
PLATELET # BLD AUTO: 87 K/UL — LOW (ref 150–400)
POIKILOCYTOSIS BLD QL AUTO: SIGNIFICANT CHANGE UP
POTASSIUM SERPL-MCNC: 5.7 MMOL/L — HIGH (ref 3.5–5.3)
POTASSIUM SERPL-SCNC: 5.7 MMOL/L — HIGH (ref 3.5–5.3)
PROT SERPL-MCNC: 5.3 G/DL — LOW (ref 6.6–8.7)
RBC # BLD: 3.72 M/UL — LOW (ref 3.8–5.2)
RBC # FLD: 14.8 % — HIGH (ref 10.3–14.5)
RBC BLD AUTO: ABNORMAL
SCHISTOCYTES BLD QL AUTO: SLIGHT — SIGNIFICANT CHANGE UP
SMUDGE CELLS # BLD: PRESENT — SIGNIFICANT CHANGE UP
SODIUM SERPL-SCNC: 132 MMOL/L — LOW (ref 135–145)
VARIANT LYMPHS # BLD: 0.9 % — SIGNIFICANT CHANGE UP (ref 0–6)
WBC # BLD: 3.29 K/UL — LOW (ref 3.8–10.5)
WBC # FLD AUTO: 3.29 K/UL — LOW (ref 3.8–10.5)

## 2022-09-16 PROCEDURE — 99284 EMERGENCY DEPT VISIT MOD MDM: CPT

## 2022-09-16 RX ORDER — CALCIUM CARBONATE 500(1250)
1 TABLET ORAL ONCE
Refills: 0 | Status: COMPLETED | OUTPATIENT
Start: 2022-09-16 | End: 2022-09-16

## 2022-09-16 RX ORDER — METHOCARBAMOL 500 MG/1
1 TABLET, FILM COATED ORAL
Qty: 28 | Refills: 0
Start: 2022-09-16 | End: 2022-09-22

## 2022-09-16 RX ORDER — MORPHINE SULFATE 50 MG/1
2 CAPSULE, EXTENDED RELEASE ORAL ONCE
Refills: 0 | Status: DISCONTINUED | OUTPATIENT
Start: 2022-09-16 | End: 2022-09-16

## 2022-09-16 RX ORDER — ACETAMINOPHEN 500 MG
500 TABLET ORAL ONCE
Refills: 0 | Status: COMPLETED | OUTPATIENT
Start: 2022-09-16 | End: 2022-09-16

## 2022-09-16 RX ORDER — METHOCARBAMOL 500 MG/1
500 TABLET, FILM COATED ORAL ONCE
Refills: 0 | Status: COMPLETED | OUTPATIENT
Start: 2022-09-16 | End: 2022-09-16

## 2022-09-16 RX ORDER — CALCIUM CARBONATE 500(1250)
1 TABLET ORAL
Qty: 6 | Refills: 0
Start: 2022-09-16 | End: 2022-09-18

## 2022-09-16 RX ADMIN — Medication 200 MILLIGRAM(S): at 23:00

## 2022-09-16 RX ADMIN — Medication 20 MILLIGRAM(S): at 22:46

## 2022-09-16 RX ADMIN — METHOCARBAMOL 500 MILLIGRAM(S): 500 TABLET, FILM COATED ORAL at 22:46

## 2022-09-16 NOTE — ED STATDOCS - OBJECTIVE STATEMENT
25 y/o female with PMHx Lupus presents to ED c/o joint pain. Patient reports joint pain and swelling, saw Rheumatologist 2 days ago, and gave patient 20mg of Prednisone, and can not treat the lupus due to protein in her urine. Patient is seeing nephrologist soon. Has been taking Tylenol and Flexeril at home, last dose of Tylenol at 4pm and took Prednisone at 530pm.     Denies fevers, N/V/D, abdominal pain, urinary symptoms  LNMP: started this week

## 2022-09-16 NOTE — ED ADULT NURSE NOTE - CHIEF COMPLAINT QUOTE
Ambulatory to ED c/o joint pain/aches. Per family member at bedside, patient was recently evaluated at [facility] ED, prescribed PO Prednisone w/o relief. Patient states taking Robaxin, Tylenol, Ibuprofen, around 1600 w/o relief. Patient able to ambulate at triage w/o difficulty. Patient denies back pain/burning urination.

## 2022-09-16 NOTE — ED ADULT NURSE NOTE - OBJECTIVE STATEMENT
Assumed care of pt at 2245 in [facility] A&Ox4 c/o joint pain, the pt states that she has been experiencing joint pain and swelling of the knees/ankles/hands/fingers/feet/wrists/elbows for about 2 weeks, the pt says she has been seeing a MD and was told that she may have Lupus, the pt is resting comfortably showing no signs of respiratory distress or pain, the pt is calm and cooperative, family at bedside, the pt denies N/V/D/CP/SOB, the pt states that she took Tylenol for the pain and a muscle relaxer

## 2022-09-16 NOTE — ED STATDOCS - PROGRESS NOTE DETAILS
Pt reassessed, feeling better at this time. Wants d/c Pt reassessed, pt feeling better at this time, vss, pt able to walk, talk and vocalized plan of action. Discussed in depth and explained to pt in depth the next steps that need to be taking including proper follow up with PCP or specialists. All incidental findings were discussed with pt as well. Pt verbalized their concerns and all questions were answered. Pt understands dispo and wants discharge. Given good instructions when to return to ED, strict return precautions and importance of f/u.

## 2022-09-16 NOTE — ED STATDOCS - CARE PROVIDER_API CALL
Dennis Meade)  Internal Medicine; Nephrology  [address] [place] [address]  Phone: [phone]  Fax: [phone]  Follow Up Time:     Lloyd Garnett)  Internal Medicine  [address] [place] [address]  Phone: [phone]  Fax: [phone]  Follow Up Time:

## 2022-09-16 NOTE — ED STATDOCS - NSFOLLOWUPINSTRUCTIONS_ED_ALL_ED_FT
- Please follow-up with your primary care doctor in the next 1-2 days.  Please call tomorrow for an appointment.  If you cannot follow-up with your primary care doctor please return to the ED for any urgent issues.  - Follow up with the nephrologist within 1-2 days.  - Follow up with the rheumatologist within 1-2 days.  - You were given a copy of the tests performed today.  Please bring the results with you and review them with your primary care doctor.  - If you have any worsening of symptoms or any other concerns please return to the ED immediately.  - Please continue taking your home medications as directed.

## 2022-09-16 NOTE — ED ADULT TRIAGE NOTE - CHIEF COMPLAINT QUOTE
Ambulatory to ED c/o joint pain/aches. Per family member at bedside, patient was recently evaluated at [facility] ED, prescribed PO Prednisone w/o relief. Patient states taking Robaxin, Tylenol, Ibuprofen, around 1600 w/o relief. Patient able to ambulate at triage w/o difficulty. Patient denies back pain/burning urination.

## 2022-09-16 NOTE — ED STATDOCS - CARE PROVIDERS DIRECT ADDRESSES
,[email or web address]

## 2022-09-16 NOTE — ED STATDOCS - ATTENDING APP SHARED VISIT CONTRIBUTION OF CARE
I, Julia Graham, performed the initial face to face bedside interview with this patient regarding history of present illness, review of symptoms and relevant past medical, social and family history.  I completed an independent physical examination.  I was the initial provider who evaluated this patient. I have signed out the follow up of any pending tests (i.e. labs, radiological studies) to the ACP.  I have communicated the patient’s plan of care and disposition with the ACP.  The history, relevant review of systems, past medical and surgical history, medical decision making, and physical examination was documented by the scribe in my presence and I attest to the accuracy of the documentation.

## 2022-09-16 NOTE — ED STATDOCS - CLINICAL SUMMARY MEDICAL DECISION MAKING FREE TEXT BOX
Patient with lupus, early in her diagnostic journey. will check labs, increase her prednisone from 20mg to 40mg daily, change her Flexeril to Robaxin because she has protein urea and Flexeril has an Aspirin like component, treat with IV Tylenol, and re-assess pain level.

## 2022-09-16 NOTE — ED STATDOCS - NS ED ATTENDING STATEMENT MOD
This was a shared visit with the HEIDI. I reviewed and verified the documentation and independently performed the documented:

## 2022-09-16 NOTE — ED STATDOCS - PATIENT PORTAL LINK FT
You can access the FollowMyHealth Patient Portal offered by [facility] by registering at the following website: [email or web address]. By joining [employer]’s FollowMyHealth portal, you will also be able to view your health information using other applications (apps) compatible with our system.

## 2022-09-17 VITALS
HEART RATE: 87 BPM | SYSTOLIC BLOOD PRESSURE: 121 MMHG | TEMPERATURE: 98 F | OXYGEN SATURATION: 98 % | RESPIRATION RATE: 16 BRPM | DIASTOLIC BLOOD PRESSURE: 87 MMHG

## 2022-09-17 PROCEDURE — 96375 TX/PRO/DX INJ NEW DRUG ADDON: CPT

## 2022-09-17 PROCEDURE — 36415 COLL VENOUS BLD VENIPUNCTURE: CPT

## 2022-09-17 PROCEDURE — 96374 THER/PROPH/DIAG INJ IV PUSH: CPT

## 2022-09-17 PROCEDURE — 99284 EMERGENCY DEPT VISIT MOD MDM: CPT | Mod: 25

## 2022-09-17 PROCEDURE — 80053 COMPREHEN METABOLIC PANEL: CPT

## 2022-09-17 PROCEDURE — 85025 COMPLETE CBC W/AUTO DIFF WBC: CPT

## 2022-09-17 RX ADMIN — Medication 1 TABLET(S): at 00:00

## 2022-09-17 RX ADMIN — MORPHINE SULFATE 2 MILLIGRAM(S): 50 CAPSULE, EXTENDED RELEASE ORAL at 00:00

## 2023-03-16 NOTE — ED PROVIDER NOTE - CONSTITUTIONAL NEGATIVE STATEMENT, MLM
Patient Education        Breast Self-Exam: Care Instructions  Your Care Instructions     A breast self-exam is when you check your breasts for lumps or changes. This regular exam helps you learn how your breasts normally look and feel. Most breast problems or changes are not because of cancer. Breast self-exam is not a substitute for a mammogram. Having regular breast exams by your doctor and regular mammograms improve your chances of finding any problems with your breasts. Some women set a time each month to do a step-by-step breast self-exam. Other women like a less formal system. They might look at their breasts as they brush their teeth, or feel their breasts once in a while in the shower. If you notice a change in your breast, tell your doctor. Follow-up care is a key part of your treatment and safety. Be sure to make and go to all appointments, and call your doctor if you are having problems. It's also a good idea to know your test results and keep a list of the medicines you take. How do you do a breast self-exam?  The best time to examine your breasts is usually one week after your menstrual period begins. Your breasts should not be tender then. If you do not have periods, you might do your exam on a day of the month that is easy to remember. To examine your breasts:  Remove all your clothes above the waist and lie down. When you are lying down, your breast tissue spreads evenly over your chest wall, which makes it easier to feel all your breast tissue. Use the pads--not the fingertips--of the 3 middle fingers of your left hand to check your right breast. Move your fingers slowly in small coin-sized circles that overlap. Use three levels of pressure to feel of all your breast tissue. Use light pressure to feel the tissue close to the skin surface. Use medium pressure to feel a little deeper. Use firm pressure to feel your tissue close to your breastbone and ribs.  Use each pressure level to feel your breast tissue before moving on to the next spot. Check your entire breast, moving up and down as if following a strip from the collarbone to the bra line, and from the armpit to the ribs. Repeat until you have covered the entire breast.  Repeat this procedure for your left breast, using the pads of the 3 middle fingers of your right hand. To examine your breasts while in the shower:  Place one arm over your head and lightly soap your breast on that side. Using the pads of your fingers, gently move your hand over your breast (in the strip pattern described above), feeling carefully for any lumps or changes. Repeat for the other breast.  Have your doctor inspect anything you notice to see if you need further testing. Where can you learn more? Go to http://www.woods.com/ and enter P148 to learn more about \"Breast Self-Exam: Care Instructions. \"  Current as of: August 2, 2022               Content Version: 13.5  © 2006-2022 [employer]. Care instructions adapted under license by [facility] ([facility]). If you have questions about a medical condition or this instruction, always ask your healthcare professional. [place] [address] any warranty or liability for your use of this information. no fever and no chills.

## 2023-05-17 ENCOUNTER — APPOINTMENT (OUTPATIENT)
Dept: RHEUMATOLOGY | Facility: CLINIC | Age: 28
End: 2023-05-17
Payer: MEDICAID

## 2023-05-17 VITALS
RESPIRATION RATE: 17 BRPM | SYSTOLIC BLOOD PRESSURE: 110 MMHG | WEIGHT: 112 LBS | BODY MASS INDEX: 19.12 KG/M2 | TEMPERATURE: 98 F | HEIGHT: 64 IN | DIASTOLIC BLOOD PRESSURE: 70 MMHG

## 2023-05-17 PROCEDURE — 99215 OFFICE O/P EST HI 40 MIN: CPT

## 2023-05-17 NOTE — ASSESSMENT
[FreeTextEntry1] : SLE/ Sjogrens- Labs with significantly elevated JADE, dsdna, hypocomplementemia, elevated inflammatory markers, leukopenia, proteinuria, SSA/SSB, RNP polymerase III\par \par Last seen 2022 as NPA and then lost to follow up\par Recent labs with elevated dsdna, low c3, proteinuria ( although improved)\par \par - c/w HCQ 200mg BID\par - baseline opthalmology evaluation \par - will plan to start treatment with SC Benlysta\par - will avoid MTX in child bearing age female\par - nephrology evaluation \par - reviewed risk and benefits of medications\par - encouraged compliance with medications and follow up visits\par \par Discussed treatment plan with the patient. The patient was given the opportunity to ask questions and all questions were answered to their satisfaction.\par

## 2023-05-17 NOTE — HISTORY OF PRESENT ILLNESS
[FreeTextEntry1] : Pt presenting today for a f.u visit for SLE/ sjogrens. Last seen 05/2022 and then lost to follow up. She states her insurance changed. \par Was following with another rheumatologist ( doesn’t remember name) and treated with pulse dose steroids and then started on HCQ 200mg BID. \par Had severe flare requiring hospitalization. \par Currently off prednisone. \par On HcQ 200mg BID. Tolerating medication well. Denies side effects. \par Feels her symptoms overall are much improved from LV. \par Still has intermittent rash and itching. Joint pain, oral ulcers, hair loss- improved. \par Recent labs from 05/2023 reviewed with pt\par Still with leukopenia, 3+proteinuria ( had 6+ in the past). elevated dsdna: 468, low c3, elevated esr. \par No prior kidney biopsy. Had not established care with nephrology. \par Denies fever, chills, CP, SOB, abd pain, blood in urine. On birth control.

## 2023-05-22 ENCOUNTER — NON-APPOINTMENT (OUTPATIENT)
Age: 28
End: 2023-05-22

## 2023-06-21 ENCOUNTER — APPOINTMENT (OUTPATIENT)
Dept: RHEUMATOLOGY | Facility: CLINIC | Age: 28
End: 2023-06-21
Payer: MEDICAID

## 2023-06-21 VITALS
TEMPERATURE: 97.5 F | BODY MASS INDEX: 19.63 KG/M2 | WEIGHT: 115 LBS | SYSTOLIC BLOOD PRESSURE: 120 MMHG | HEIGHT: 64 IN | RESPIRATION RATE: 17 BRPM | DIASTOLIC BLOOD PRESSURE: 84 MMHG

## 2023-06-21 LAB
ALBUMIN SERPL ELPH-MCNC: 2.5 G/DL
ALP BLD-CCNC: 45 U/L
ALT SERPL-CCNC: 10 U/L
ANA SER IF-ACNC: NEGATIVE
ANION GAP SERPL CALC-SCNC: 12 MMOL/L
AST SERPL-CCNC: 13 U/L
BILIRUB SERPL-MCNC: <0.2 MG/DL
BUN SERPL-MCNC: 31 MG/DL
C3 SERPL-MCNC: 95 MG/DL
C4 SERPL-MCNC: 24 MG/DL
CALCIUM SERPL-MCNC: 8.8 MG/DL
CCP AB SER IA-ACNC: <8 UNITS
CHLORIDE SERPL-SCNC: 102 MMOL/L
CK SERPL-CCNC: 19 U/L
CO2 SERPL-SCNC: 21 MMOL/L
CREAT SERPL-MCNC: 0.96 MG/DL
CREAT SPEC-SCNC: 45 MG/DL
CREAT/PROT UR: 6.6 RATIO
CRP SERPL-MCNC: <3 MG/L
DSDNA AB SER-ACNC: 84 IU/ML
EGFR: 84 ML/MIN/1.73M2
ENA SS-A AB SER IA-ACNC: >8 AL
ENA SS-B AB SER IA-ACNC: <0.2 AL
ERYTHROCYTE [SEDIMENTATION RATE] IN BLOOD BY WESTERGREN METHOD: 120 MM/HR
GLUCOSE SERPL-MCNC: 85 MG/DL
HAV IGM SER QL: NONREACTIVE
HBV CORE IGG+IGM SER QL: NONREACTIVE
HBV CORE IGM SER QL: NONREACTIVE
HBV E AB SER QL: NONREACTIVE
HBV E AG SER QL: NONREACTIVE
HBV SURFACE AB SER QL: REACTIVE
HBV SURFACE AG SER QL: NONREACTIVE
HCV AB SER QL: NONREACTIVE
HCV S/CO RATIO: 0.06 S/CO
HEPATITIS A IGG ANTIBODY: REACTIVE
HEPATITIS A IGG ANTIBODY: REACTIVE
HISTONE AB SER QL: 1.7 UNITS
M TB IFN-G BLD-IMP: NEGATIVE
POTASSIUM SERPL-SCNC: 4.9 MMOL/L
PROT SERPL-MCNC: 4.8 G/DL
PROT UR-MCNC: 301 MG/DL
QUANTIFERON TB PLUS MITOGEN MINUS NIL: 6.77 IU/ML
QUANTIFERON TB PLUS NIL: 0.03 IU/ML
QUANTIFERON TB PLUS TB1 MINUS NIL: 0 IU/ML
QUANTIFERON TB PLUS TB2 MINUS NIL: -0.01 IU/ML
RF+CCP IGG SER-IMP: NEGATIVE
RHEUMATOID FACT SER QL: <10 IU/ML
SODIUM SERPL-SCNC: 135 MMOL/L
TSH SERPL-ACNC: 3.1 UIU/ML

## 2023-06-21 PROCEDURE — 99215 OFFICE O/P EST HI 40 MIN: CPT

## 2023-06-21 NOTE — HISTORY OF PRESENT ILLNESS
[FreeTextEntry1] : Pt presenting today for a f.u visit for SLE/ sjogrens. Last seen 05/2023. Chart reviewed since LV.\par Labs from 06/2023 reviewed with pt in length. \par LV started on SC Benlysta. Also taking HCQ 200mg daily, prednisone 20mg daily. Tolerating medication well. Denies side effects\par Does report to have improvement in symptoms with the addition of Benlysta thus far. \par Has appointment to see nephrology next week. \par Still has intermittent rash and itching. Joint pain, oral ulcers, hair loss- improved. \par Denies fever, chills, CP, SOB, abd pain, blood in urine. On birth control. \par ROS otherwise unchanged from LV.

## 2023-06-21 NOTE — ASSESSMENT
[FreeTextEntry1] : SLE/ Sjogrens- Labs with significantly elevated JADE, dsdna, hypocomplementemia, elevated inflammatory markers, leukopenia, proteinuria, SSA/SSB, RNP polymerase III\par \par Currently with improvement in symptoms with Benlysta\par \par - repeat labs prior to follow up\par - c/w SC Benlysta\par - c/w HCQ 200mg daily. Can increase to HCQ 300mg daily based on her weight\par - taper prednisone to 10mg daily\par - opthalmology evaluation \par - will avoid MTX in child bearing age female\par - nephrology evaluation \par - reviewed risk and benefits of medications\par - encouraged compliance with medications and follow up visits\par \par Discussed treatment plan with the patient. The patient was given the opportunity to ask questions and all questions were answered to their satisfaction.\par

## 2023-06-29 ENCOUNTER — TRANSCRIPTION ENCOUNTER (OUTPATIENT)
Age: 28
End: 2023-06-29

## 2023-06-29 ENCOUNTER — APPOINTMENT (OUTPATIENT)
Dept: NEPHROLOGY | Facility: CLINIC | Age: 28
End: 2023-06-29
Payer: MEDICAID

## 2023-06-29 ENCOUNTER — NON-APPOINTMENT (OUTPATIENT)
Age: 28
End: 2023-06-29

## 2023-06-29 VITALS
HEART RATE: 133 BPM | SYSTOLIC BLOOD PRESSURE: 122 MMHG | WEIGHT: 116 LBS | OXYGEN SATURATION: 99 % | BODY MASS INDEX: 19.81 KG/M2 | DIASTOLIC BLOOD PRESSURE: 72 MMHG | HEIGHT: 64 IN

## 2023-06-29 PROCEDURE — 99203 OFFICE O/P NEW LOW 30 MIN: CPT

## 2023-06-29 NOTE — PHYSICAL EXAM
[General Appearance - Alert] : alert [General Appearance - In No Acute Distress] : in no acute distress [Sclera] : the sclera and conjunctiva were normal [Hearing Threshold Finger Rub Not [place]] : hearing was normal [Auscultation Breath Sounds / Voice Sounds] : lungs were clear to auscultation bilaterally [Heart Sounds] : normal S1 and S2 [Edema] : there was no peripheral edema [Abdomen Tenderness] : non-tender [No CVA Tenderness] : no ~M costovertebral angle tenderness [Abnormal Walk] : normal gait [] : no rash [No Focal Deficits] : no focal deficits [Oriented To Time, Place, And Person] : oriented to person, place, and time [Impaired Insight] : insight and judgment were intact

## 2023-07-11 ENCOUNTER — LABORATORY RESULT (OUTPATIENT)
Age: 28
End: 2023-07-11

## 2023-07-11 NOTE — ASSESSMENT
[FreeTextEntry1] : Nephrotic syndrome\par SLE on HCQ and benlysta\par \par \par \par Labs reviewed\par Obtain renal US\par Kidney biopsy\par \par RTC in 1 month

## 2023-07-11 NOTE — HISTORY OF PRESENT ILLNESS
[FreeTextEntry1] : 27 year old woman with PMH of SLE/ Sjogrens diagnosed last eyar in August- s/p benlysta now with improving symptoms. She is now on HCQ and Benlysta and follows with Dr. Hernandez.\par Pt referred to us for evaluation of proteinuria.\par pt denies hematuria, reports foamy urine which is improving\par also reports generalized edema back in October;was never prescribed diuretics\par Reports leg edema at the end of the day\par Denies NSAID use\par \par SH; non smoker, no Etoh/ illicit drug abuse\par Works as dental hygienist

## 2023-07-13 ENCOUNTER — APPOINTMENT (OUTPATIENT)
Dept: ULTRASOUND IMAGING | Facility: CLINIC | Age: 28
End: 2023-07-13
Payer: MEDICAID

## 2023-07-13 ENCOUNTER — OUTPATIENT (OUTPATIENT)
Dept: OUTPATIENT SERVICES | Facility: HOSPITAL | Age: 28
LOS: 1 days | End: 2023-07-13

## 2023-07-13 DIAGNOSIS — M32.9 SYSTEMIC LUPUS ERYTHEMATOSUS, UNSPECIFIED: ICD-10-CM

## 2023-07-13 PROCEDURE — 76775 US EXAM ABDO BACK WALL LIM: CPT | Mod: 26

## 2023-07-20 ENCOUNTER — NON-APPOINTMENT (OUTPATIENT)
Age: 28
End: 2023-07-20

## 2023-07-23 LAB
APPEARANCE: CLEAR
APTT BLD: 24.9 SEC
BACTERIA: ABNORMAL /HPF
BILIRUBIN URINE: NEGATIVE
BLOOD URINE: ABNORMAL
CAST: NORMAL /LPF
COLOR: YELLOW
CREAT SPEC-SCNC: 81 MG/DL
CREAT/PROT UR: 7.2 RATIO
EPITHELIAL CELLS: 2 /HPF
GLUCOSE QUALITATIVE U: NEGATIVE MG/DL
KETONES URINE: NEGATIVE MG/DL
LEUKOCYTE ESTERASE URINE: NEGATIVE
MICROSCOPIC-UA: NORMAL
NITRITE URINE: NEGATIVE
PH URINE: 6.5
PROT UR-MCNC: 581 MG/DL
PROTEIN URINE: 300 MG/DL
RED BLOOD CELLS URINE: 4 /HPF
SPECIFIC GRAVITY URINE: 1.02
UROBILINOGEN URINE: 0.2 MG/DL
WHITE BLOOD CELLS URINE: 3 /HPF

## 2023-08-01 ENCOUNTER — OUTPATIENT (OUTPATIENT)
Dept: OUTPATIENT SERVICES | Facility: HOSPITAL | Age: 28
LOS: 1 days | End: 2023-08-01
Payer: MEDICAID

## 2023-08-01 VITALS
DIASTOLIC BLOOD PRESSURE: 75 MMHG | SYSTOLIC BLOOD PRESSURE: 115 MMHG | TEMPERATURE: 97 F | OXYGEN SATURATION: 97 % | RESPIRATION RATE: 16 BRPM | WEIGHT: 123.46 LBS | HEIGHT: 64 IN | HEART RATE: 99 BPM

## 2023-08-01 DIAGNOSIS — Z01.818 ENCOUNTER FOR OTHER PREPROCEDURAL EXAMINATION: ICD-10-CM

## 2023-08-01 DIAGNOSIS — Z78.9 OTHER SPECIFIED HEALTH STATUS: ICD-10-CM

## 2023-08-01 DIAGNOSIS — Z29.9 ENCOUNTER FOR PROPHYLACTIC MEASURES, UNSPECIFIED: ICD-10-CM

## 2023-08-01 DIAGNOSIS — N63.10 UNSPECIFIED LUMP IN THE RIGHT BREAST, UNSPECIFIED QUADRANT: Chronic | ICD-10-CM

## 2023-08-01 DIAGNOSIS — N04.9 NEPHROTIC SYNDROME WITH UNSPECIFIED MORPHOLOGIC CHANGES: ICD-10-CM

## 2023-08-01 DIAGNOSIS — M32.9 SYSTEMIC LUPUS ERYTHEMATOSUS, UNSPECIFIED: ICD-10-CM

## 2023-08-01 LAB
ALBUMIN SERPL ELPH-MCNC: 2.5 G/DL — LOW (ref 3.3–5.2)
ALP SERPL-CCNC: 53 U/L — SIGNIFICANT CHANGE UP (ref 40–120)
ALT FLD-CCNC: 7 U/L — SIGNIFICANT CHANGE UP
ANION GAP SERPL CALC-SCNC: 16 MMOL/L — SIGNIFICANT CHANGE UP (ref 5–17)
APTT BLD: 25.8 SEC — SIGNIFICANT CHANGE UP (ref 24.5–35.6)
AST SERPL-CCNC: 18 U/L — SIGNIFICANT CHANGE UP
BASOPHILS # BLD AUTO: 0.03 K/UL — SIGNIFICANT CHANGE UP (ref 0–0.2)
BASOPHILS NFR BLD AUTO: 0.3 % — SIGNIFICANT CHANGE UP (ref 0–2)
BILIRUB SERPL-MCNC: <0.2 MG/DL — LOW (ref 0.4–2)
BUN SERPL-MCNC: 19.8 MG/DL — SIGNIFICANT CHANGE UP (ref 8–20)
CALCIUM SERPL-MCNC: 8.9 MG/DL — SIGNIFICANT CHANGE UP (ref 8.4–10.5)
CHLORIDE SERPL-SCNC: 103 MMOL/L — SIGNIFICANT CHANGE UP (ref 96–108)
CO2 SERPL-SCNC: 22 MMOL/L — SIGNIFICANT CHANGE UP (ref 22–29)
CREAT SERPL-MCNC: 0.94 MG/DL — SIGNIFICANT CHANGE UP (ref 0.5–1.3)
EGFR: 86 ML/MIN/1.73M2 — SIGNIFICANT CHANGE UP
EOSINOPHIL # BLD AUTO: 0.04 K/UL — SIGNIFICANT CHANGE UP (ref 0–0.5)
EOSINOPHIL NFR BLD AUTO: 0.4 % — SIGNIFICANT CHANGE UP (ref 0–6)
GLUCOSE SERPL-MCNC: 192 MG/DL — HIGH (ref 70–99)
HCG SERPL-ACNC: <4 MIU/ML — SIGNIFICANT CHANGE UP
HCT VFR BLD CALC: 29 % — LOW (ref 34.5–45)
HGB BLD-MCNC: 9.3 G/DL — LOW (ref 11.5–15.5)
IMM GRANULOCYTES NFR BLD AUTO: 1.2 % — HIGH (ref 0–0.9)
INR BLD: 0.83 RATIO — LOW (ref 0.85–1.18)
LYMPHOCYTES # BLD AUTO: 2.3 K/UL — SIGNIFICANT CHANGE UP (ref 1–3.3)
LYMPHOCYTES # BLD AUTO: 23.7 % — SIGNIFICANT CHANGE UP (ref 13–44)
MCHC RBC-ENTMCNC: 30.8 PG — SIGNIFICANT CHANGE UP (ref 27–34)
MCHC RBC-ENTMCNC: 32.1 GM/DL — SIGNIFICANT CHANGE UP (ref 32–36)
MCV RBC AUTO: 96 FL — SIGNIFICANT CHANGE UP (ref 80–100)
MONOCYTES # BLD AUTO: 0.52 K/UL — SIGNIFICANT CHANGE UP (ref 0–0.9)
MONOCYTES NFR BLD AUTO: 5.3 % — SIGNIFICANT CHANGE UP (ref 2–14)
NEUTROPHILS # BLD AUTO: 6.71 K/UL — SIGNIFICANT CHANGE UP (ref 1.8–7.4)
NEUTROPHILS NFR BLD AUTO: 69.1 % — SIGNIFICANT CHANGE UP (ref 43–77)
PLATELET # BLD AUTO: 330 K/UL — SIGNIFICANT CHANGE UP (ref 150–400)
POTASSIUM SERPL-MCNC: 5.6 MMOL/L — HIGH (ref 3.5–5.3)
POTASSIUM SERPL-SCNC: 5.6 MMOL/L — HIGH (ref 3.5–5.3)
PROT SERPL-MCNC: 5.2 G/DL — LOW (ref 6.6–8.7)
PROTHROM AB SERPL-ACNC: 9.3 SEC — LOW (ref 9.5–13)
RBC # BLD: 3.02 M/UL — LOW (ref 3.8–5.2)
RBC # FLD: 13.2 % — SIGNIFICANT CHANGE UP (ref 10.3–14.5)
SODIUM SERPL-SCNC: 141 MMOL/L — SIGNIFICANT CHANGE UP (ref 135–145)
WBC # BLD: 9.72 K/UL — SIGNIFICANT CHANGE UP (ref 3.8–10.5)
WBC # FLD AUTO: 9.72 K/UL — SIGNIFICANT CHANGE UP (ref 3.8–10.5)

## 2023-08-01 PROCEDURE — G0463: CPT

## 2023-08-01 PROCEDURE — 85610 PROTHROMBIN TIME: CPT

## 2023-08-01 PROCEDURE — 85025 COMPLETE CBC W/AUTO DIFF WBC: CPT

## 2023-08-01 PROCEDURE — 84702 CHORIONIC GONADOTROPIN TEST: CPT

## 2023-08-01 PROCEDURE — 85730 THROMBOPLASTIN TIME PARTIAL: CPT

## 2023-08-01 PROCEDURE — 80053 COMPREHEN METABOLIC PANEL: CPT

## 2023-08-01 PROCEDURE — 36415 COLL VENOUS BLD VENIPUNCTURE: CPT

## 2023-08-01 RX ORDER — SODIUM CHLORIDE 9 MG/ML
3 INJECTION INTRAMUSCULAR; INTRAVENOUS; SUBCUTANEOUS ONCE
Refills: 0 | Status: DISCONTINUED | OUTPATIENT
Start: 2023-08-10 | End: 2023-08-24

## 2023-08-01 NOTE — H&P PST ADULT - PROBLEM SELECTOR PLAN 1
Patient is scheduled for kidney biopsy in Radiologist with Dr Henry on 8/10/23  K 5.6 elevated, LM for patient to call back for repeat

## 2023-08-01 NOTE — H&P PST ADULT - ASSESSMENT
CAPRINI SCORE    AGE RELATED RISK FACTORS                                                             [ ] Age 41-60 years                                            (1 Point)  [ ] Age: 61-74 years                                           (2 Points)                 [ ] Age= 75 years                                                (3 Points)             DISEASE RELATED RISK FACTORS                                                       [ ] Edema in the lower extremities                 (1 Point)                     [ ] Varicose veins                                               (1 Point)                                 [ ] BMI > 25 Kg/m2                                            (1 Point)                                  [ ] Serious infection (ie PNA)                            (1 Point)                     [ ] Lung disease ( COPD, Emphysema)            (1 Point)                                                                          [ ] Acute myocardial infarction                         (1 Point)                  [ ] Congestive heart failure (in the previous month)  (1 Point)         [ ] Inflammatory bowel disease                            (1 Point)                  [ ] Central venous access, PICC or Port               (2 points)       (within the last month)                                                                [ ] Stroke (in the previous month)                        (5 Points)    [ ] Previous or present malignancy                       (2 points)                                                                                                                                                         HEMATOLOGY RELATED FACTORS                                                         [ ] Prior episodes of VTE                                     (3 Points)                     [ ] Positive family history for VTE                      (3 Points)                  [ ] Prothrombin 77826 A                                     (3 Points)                     [ ] Factor V Leiden                                                (3 Points)                        [ ] Lupus anticoagulants                                      (3 Points)                                                           [ ] Anticardiolipin antibodies                              (3 Points)                                                       [ ] High homocysteine in the blood                   (3 Points)                                             [ ] Other congenital or acquired thrombophilia      (3 Points)                                                [ ] Heparin induced thrombocytopenia                  (3 Points)                                        MOBILITY RELATED FACTORS  [ ] Bed rest                                                         (1 Point)  [ ] Plaster cast                                                    (2 points)  [ ] Bed bound for more than 72 hours           (2 Points)    GENDER SPECIFIC FACTORS  [ ] Pregnancy or had a baby within the last month   (1 Point)  [ ] Post-partum < 6 weeks                                   (1 Point)  [ ] Hormonal therapy  or oral contraception   (1 Point)  [ ] History of pregnancy complications              (1 point)  [ ] Unexplained or recurrent              (1 Point)    OTHER RISK FACTORS                                           (1 Point)  [ ] BMI >40, smoking, diabetes requiring insulin, chemotherapy  blood transfusions and length of surgery over 2 hours    SURGERY RELATED RISK FACTORS  [ ]  Section within the last month     (1 Point)  [ ] Minor surgery                                                  (1 Point)  [ ] Arthroscopic surgery                                       (2 Points)  [ ] Planned major surgery lasting more            (2 Points)      than 45 minutes     [ ] Elective hip or knee joint replacement       (5 points)       surgery                                                TRAUMA RELATED RISK FACTORS  [ ] Fracture of the hip, pelvis, or leg                       (5 Points)  [ ] Spinal cord injury resulting in paralysis             (5 points)       (in the previous month)    [ ] Paralysis  (less than 1 month)                             (5 Points)  [ ] Multiple Trauma within 1 month                        (5 Points)    Total Score [        ]    Caprini Score 0-2: Low Risk, NO VTE prophylaxis required for most patients, encourage ambulation  Caprini Score 3-6: Moderate Risk , pharmacologic VTE prophylaxis is indicated for most patients (in the absence of contraindications)  Caprini Score Greater than or =7: High risk, pharmocologic VTE prophylaxis indicated for most patients (in the absence of contraindications)                  OPIOID RISK TOOL    RAMIN EACH BOX THAT APPLIES AND ADD TOTALS AT THE END    FAMILY HISTORY OF SUBSTANCE ABUSE                 FEMALE         MALE                                                Alcohol                             [  ]1 pt          [  ]3pts                                               Illegal Durgs                     [  ]2 pts        [  ]3pts                                               Rx Drugs                           [  ]4 pts        [  ]4 pts    PERSONAL HISTORY OF SUBSTANCE ABUSE                                                                                          Alcohol                             [  ]3 pts       [  ]3 pts                                               Illegal Durgs                     [  ]4 pts        [  ]4 pts                                               Rx Drugs                           [  ]5 pts        [  ]5 pts    AGE BETWEEN 16-45 YEARS                                      [  ]1 pt         [  ]1 pt    HISTORY OF PREADOLESCENT   SEXUAL ABUSE                                                             [  ]3 pts        [  ]0pts    PSYCHOLOGICAL DISEASE                     ADD, OCD, Bipolar, Schizophrenia        [  ]2 pts         [  ]2 pts                      Depression                                               [  ]1 pt           [  ]1 pt           SCORING TOTAL   (add numbers and type here)              (***)                                     A score of 3 or lower indicated LOW risk for future opiod abuse  A score of 4 to 7 indicated moderate risk for future opiod abuse  A score of 8 or higher indicates a high risk for opiod abuse             CAPRINI SCORE    AGE RELATED RISK FACTORS                                                             [ ] Age 41-60 years                                            (1 Point)  [ ] Age: 61-74 years                                           (2 Points)                 [ ] Age= 75 years                                                (3 Points)             DISEASE RELATED RISK FACTORS                                                       [x ] Edema in the lower extremities                 (1 Point)                     [ ] Varicose veins                                               (1 Point)                                 [ ] BMI > 25 Kg/m2                                            (1 Point)                                  [ ] Serious infection (ie PNA)                            (1 Point)                     [ ] Lung disease ( COPD, Emphysema)            (1 Point)                                                                          [ ] Acute myocardial infarction                         (1 Point)                  [ ] Congestive heart failure (in the previous month)  (1 Point)         [ ] Inflammatory bowel disease                            (1 Point)                  [ ] Central venous access, PICC or Port               (2 points)       (within the last month)                                                                [ ] Stroke (in the previous month)                        (5 Points)    [ ] Previous or present malignancy                       (2 points)                                                                                                                                                         HEMATOLOGY RELATED FACTORS                                                         [ ] Prior episodes of VTE                                     (3 Points)                     [ ] Positive family history for VTE                      (3 Points)                  [ ] Prothrombin 37209 A                                     (3 Points)                     [ ] Factor V Leiden                                                (3 Points)                        [ ] Lupus anticoagulants                                      (3 Points)                                                           [ ] Anticardiolipin antibodies                              (3 Points)                                                       [ ] High homocysteine in the blood                   (3 Points)                                             [ ] Other congenital or acquired thrombophilia      (3 Points)                                                [ ] Heparin induced thrombocytopenia                  (3 Points)                                        MOBILITY RELATED FACTORS  [ ] Bed rest                                                         (1 Point)  [ ] Plaster cast                                                    (2 points)  [ ] Bed bound for more than 72 hours           (2 Points)    GENDER SPECIFIC FACTORS  [ ] Pregnancy or had a baby within the last month   (1 Point)  [ ] Post-partum < 6 weeks                                   (1 Point)  [ ] Hormonal therapy  or oral contraception   (1 Point)  [ ] History of pregnancy complications              (1 point)  [ ] Unexplained or recurrent              (1 Point)    OTHER RISK FACTORS                                           (1 Point)  [ ] BMI >40, smoking, diabetes requiring insulin, chemotherapy  blood transfusions and length of surgery over 2 hours    SURGERY RELATED RISK FACTORS  [ ]  Section within the last month     (1 Point)  [ ] Minor surgery                                                  (1 Point)  [ ] Arthroscopic surgery                                       (2 Points)  [x ] Planned major surgery lasting more            (2 Points)      than 45 minutes     [ ] Elective hip or knee joint replacement       (5 points)       surgery                                                TRAUMA RELATED RISK FACTORS  [ ] Fracture of the hip, pelvis, or leg                       (5 Points)  [ ] Spinal cord injury resulting in paralysis             (5 points)       (in the previous month)    [ ] Paralysis  (less than 1 month)                             (5 Points)  [ ] Multiple Trauma within 1 month                        (5 Points)    Total Score [    3    ]    Caprini Score 0-2: Low Risk, NO VTE prophylaxis required for most patients, encourage ambulation  Caprini Score 3-6: Moderate Risk , pharmacologic VTE prophylaxis is indicated for most patients (in the absence of contraindications)  Caprini Score Greater than or =7: High risk, pharmocologic VTE prophylaxis indicated for most patients (in the absence of contraindications)      OPIOID RISK TOOL    RAMIN EACH BOX THAT APPLIES AND ADD TOTALS AT THE END    FAMILY HISTORY OF SUBSTANCE ABUSE                 FEMALE         MALE                                                Alcohol                             [  ]1 pt          [  ]3pts                                               Illegal Durgs                     [  ]2 pts        [  ]3pts                                               Rx Drugs                           [  ]4 pts        [  ]4 pts    PERSONAL HISTORY OF SUBSTANCE ABUSE                                                                                          Alcohol                             [  ]3 pts       [  ]3 pts                                               Illegal Durgs                     [  ]4 pts        [  ]4 pts                                               Rx Drugs                           [  ]5 pts        [  ]5 pts    AGE BETWEEN 16-45 YEARS                                      [x  ]1 pt         [  ]1 pt    HISTORY OF PREADOLESCENT   SEXUAL ABUSE                                                             [  ]3 pts        [  ]0pts    PSYCHOLOGICAL DISEASE                     ADD, OCD, Bipolar, Schizophrenia        [  ]2 pts         [  ]2 pts                      Depression                                               [  ]1 pt           [  ]1 pt           SCORING TOTAL  1                                 A score of 3 or lower indicated LOW risk for future opiod abuse  A score of 4 to 7 indicated moderate risk for future opiod abuse  A score of 8 or higher indicates a high risk for opiod abuse    27 year old woman with PMH of SLE/ Sjogrens diagnosed 2022- currently on Benlysta and HCQ follows with Dr. Hernandez. Patient found to have proteinuria, reports LE swelling and abdominal swelling, foamy urine.  US kidneys revealed Bilateral renal caliectasis. Probable stone in the upper pole of the left kidney. Today at PST she reports abdominal swelling, but denies pain, she reports LE swelling that has improved with lasix and compression stockings. She denies fever, chills, dysuria, decrease UO or hematuria. Patient is scheduled for kidney biopsy in Radiologist with Dr Henry. Patient educated on surgical scrub, preadmission instructions, and day of procedure medications, verbalizes understanding. Pt instructed to stop vitamins/supplements/herbal medications/ASA/NSAIDS for one week prior to surgery and discuss with PMD.

## 2023-08-01 NOTE — H&P PST ADULT - NSICDXFAMILYHX_GEN_ALL_CORE_FT
FAMILY HISTORY:  Mother  Still living? Unknown  FH: type 2 diabetes, Age at diagnosis: Age Unknown    Grandparent  Still living? Unknown  FH: breast cancer, Age at diagnosis: Age Unknown  FH: type 2 diabetes, Age at diagnosis: Age Unknown

## 2023-08-01 NOTE — H&P PST ADULT - HISTORY OF PRESENT ILLNESS
27 year old woman with PMH of SLE/ Sjogrens diagnosed last eyar in August- s/p benlysta now with improving symptoms. She is now on HCQ and Benlysta and follows with Dr. Hernandez.  Pt referred to us for evaluation of proteinuria.  pt denies hematuria, reports foamy urine which is improving  also reports generalized edema back in October;was never prescribed diuretics  Reports leg edema at the end of the day  Denies NSAID use      US kidneys revealed Bilateral renal caliectasis. Probable stone in the upper pole of the left kidney.   27 year old woman with PMH of SLE/ Sjogrens diagnosed last eyar in August- s/p benlysta now with improving symptoms. She is now on HCQ and Benlysta and follows with Dr. Hernandez.  Pt referred to us for evaluation of proteinuria.  pt denies hematuria, reports foamy urine which is improving  reports discomfort when she has swelling but denies pain  denies fever, chills,   Reports abdominal swelling, LE swelling improved with lasix   Patient is scheduled for kidney biopsy       US kidneys revealed Bilateral renal caliectasis. Probable stone in the upper pole of the left kidney.   27 year old woman with PMH of SLE/ Sjogrens diagnosed August 2022- currently on Benlysta and HCQ follows with Dr. Hernandez. Patient found to have proteinuria, reports LE swelling and abdominal swelling, foamy urine.  US kidneys revealed Bilateral renal caliectasis. Probable stone in the upper pole of the left kidney. Today at PST she reports abdominal swelling, but denies pain, she reports LE swelling that has improved with lasix and compression stockings. She denies fever, chills, dysuria, decrease UO or hematuria. Patient is scheduled for kidney biopsy in Radiologist with Dr Henry.

## 2023-08-01 NOTE — H&P PST ADULT - MUSCULOSKELETAL
normal gait negative no joint swelling/no joint erythema/no calf tenderness/normal gait/strength 5/5 bilateral upper extremities/strength 5/5 bilateral lower extremities

## 2023-08-01 NOTE — H&P PST ADULT - RESPIRATORY RATE (BREATHS/MIN)
Alert-The patient is alert, awake and responds to voice. The patient is oriented to time, place, and person. The triage nurse is able to obtain subjective information. 16

## 2023-08-01 NOTE — H&P PST ADULT - NSICDXPASTMEDICALHX_GEN_ALL_CORE_FT
PAST MEDICAL HISTORY:  Nephrotic syndrome      PAST MEDICAL HISTORY:  Nephrotic syndrome     Proteinuria     SLE (systemic lupus erythematosus)

## 2023-08-02 ENCOUNTER — OUTPATIENT (OUTPATIENT)
Dept: OUTPATIENT SERVICES | Facility: HOSPITAL | Age: 28
LOS: 1 days | End: 2023-08-02
Payer: MEDICAID

## 2023-08-02 ENCOUNTER — APPOINTMENT (OUTPATIENT)
Dept: NEPHROLOGY | Facility: CLINIC | Age: 28
End: 2023-08-02
Payer: MEDICAID

## 2023-08-02 ENCOUNTER — APPOINTMENT (OUTPATIENT)
Dept: RHEUMATOLOGY | Facility: CLINIC | Age: 28
End: 2023-08-02
Payer: MEDICAID

## 2023-08-02 VITALS
BODY MASS INDEX: 21 KG/M2 | WEIGHT: 123 LBS | DIASTOLIC BLOOD PRESSURE: 74 MMHG | OXYGEN SATURATION: 97 % | SYSTOLIC BLOOD PRESSURE: 118 MMHG | HEIGHT: 64 IN | HEART RATE: 86 BPM

## 2023-08-02 VITALS
RESPIRATION RATE: 17 BRPM | DIASTOLIC BLOOD PRESSURE: 70 MMHG | OXYGEN SATURATION: 99 % | SYSTOLIC BLOOD PRESSURE: 110 MMHG | BODY MASS INDEX: 21 KG/M2 | HEART RATE: 122 BPM | HEIGHT: 64 IN | WEIGHT: 123 LBS | TEMPERATURE: 98.2 F

## 2023-08-02 DIAGNOSIS — N63.10 UNSPECIFIED LUMP IN THE RIGHT BREAST, UNSPECIFIED QUADRANT: Chronic | ICD-10-CM

## 2023-08-02 DIAGNOSIS — Z01.812 ENCOUNTER FOR PREPROCEDURAL LABORATORY EXAMINATION: ICD-10-CM

## 2023-08-02 PROBLEM — M32.9 SYSTEMIC LUPUS ERYTHEMATOSUS, UNSPECIFIED: Chronic | Status: ACTIVE | Noted: 2023-08-01

## 2023-08-02 PROBLEM — N04.9 NEPHROTIC SYNDROME WITH UNSPECIFIED MORPHOLOGIC CHANGES: Chronic | Status: ACTIVE | Noted: 2023-08-01

## 2023-08-02 PROBLEM — R80.9 PROTEINURIA, UNSPECIFIED: Chronic | Status: ACTIVE | Noted: 2023-08-01

## 2023-08-02 LAB
ALBUMIN SERPL ELPH-MCNC: 2.3 G/DL — LOW (ref 3.3–5.2)
ALP SERPL-CCNC: 52 U/L — SIGNIFICANT CHANGE UP (ref 40–120)
ALT FLD-CCNC: 7 U/L — SIGNIFICANT CHANGE UP
ANION GAP SERPL CALC-SCNC: 13 MMOL/L — SIGNIFICANT CHANGE UP (ref 5–17)
AST SERPL-CCNC: 17 U/L — SIGNIFICANT CHANGE UP
BILIRUB SERPL-MCNC: <0.2 MG/DL — LOW (ref 0.4–2)
BUN SERPL-MCNC: 19.6 MG/DL — SIGNIFICANT CHANGE UP (ref 8–20)
CALCIUM SERPL-MCNC: 8.7 MG/DL — SIGNIFICANT CHANGE UP (ref 8.4–10.5)
CHLORIDE SERPL-SCNC: 104 MMOL/L — SIGNIFICANT CHANGE UP (ref 96–108)
CO2 SERPL-SCNC: 24 MMOL/L — SIGNIFICANT CHANGE UP (ref 22–29)
CREAT SERPL-MCNC: 1.07 MG/DL — SIGNIFICANT CHANGE UP (ref 0.5–1.3)
EGFR: 73 ML/MIN/1.73M2 — SIGNIFICANT CHANGE UP
GLUCOSE SERPL-MCNC: 92 MG/DL — SIGNIFICANT CHANGE UP (ref 70–99)
POTASSIUM SERPL-MCNC: 4.9 MMOL/L — SIGNIFICANT CHANGE UP (ref 3.5–5.3)
POTASSIUM SERPL-SCNC: 4.9 MMOL/L — SIGNIFICANT CHANGE UP (ref 3.5–5.3)
PROT SERPL-MCNC: 5.3 G/DL — LOW (ref 6.6–8.7)
SODIUM SERPL-SCNC: 141 MMOL/L — SIGNIFICANT CHANGE UP (ref 135–145)

## 2023-08-02 PROCEDURE — 99214 OFFICE O/P EST MOD 30 MIN: CPT

## 2023-08-02 RX ORDER — BUPROPION HCL 150 MG
150 TABLET,SUSTAINED-RELEASE 12 HR ORAL DAILY
Refills: 0 | Status: DISCONTINUED | COMMUNITY
End: 2023-08-02

## 2023-08-02 NOTE — ASSESSMENT
[FreeTextEntry1] : SLE on HCQ and benlysta started in June Nephrotic syndrome UA with 300 protein- trace blood  Tp/cr ratio 7.2 gm <-- 6.6 Albumin levels 2.5   Renal US reviewed. Scheduled for kidney biopsy on 08/10 Anasarca improving- Continue lasix 40 mg daily  labs from PST reviwed SCr trending up change lasix to qod Obtain UA, tp/cr ratio  RTC in 1 month

## 2023-08-02 NOTE — PHYSICAL EXAM
[General Appearance - Alert] : alert [General Appearance - In No Acute Distress] : in no acute distress [Sclera] : the sclera and conjunctiva were normal [Hearing Threshold Finger Rub Not [place]] : hearing was normal [Auscultation Breath Sounds / Voice Sounds] : lungs were clear to auscultation bilaterally [Heart Sounds] : normal S1 and S2 [Edema] : there was no peripheral edema [Abdomen Tenderness] : non-tender [No CVA Tenderness] : no ~M costovertebral angle tenderness [Abnormal Walk] : normal gait [] : no rash [No Focal Deficits] : no focal deficits [Oriented To Time, Place, And Person] : oriented to person, place, and time [Impaired Insight] : insight and judgment were intact

## 2023-08-02 NOTE — HISTORY OF PRESENT ILLNESS
[FreeTextEntry1] : 27 year old woman with PMH of SLE/ Sjogrens diagnosed last eyar in August- s/p benlysta now with improving symptoms. She is now on HCQ and Benlysta and follows with Dr. Hernandez. Pt referred to us for evaluation of proteinuria. pt denies hematuria, reports foamy urine which is improving also reports generalized edema back in October;was never prescribed diuretics Reports leg edema at the end of the day Denies NSAID use  SH; non smoker, no Etoh/ illicit drug abuse Works as dental hygienist  --------------------------------------------------------  08/02 Pt presents for follow up of proteinuria. pt seen and examined; feels well. She had called our office reporting 10 lbs weight gain and worsening abdominal and leg edema and was given Lasix 40 mg daily. pt reports leg edema has resolved but she continues to have mild abdominal fullness. Her weight is now 123 down from 130.  Pt is on Benlysta weekly and on  once daily- also on prednisone taper 10 mg daily. Urine is less foamy- she is scheduled for kidney biopsy on 08/10.

## 2023-08-03 NOTE — ASSESSMENT
[FreeTextEntry1] : SLE/ Sjogrens- Labs with significantly elevated JADE, dsdna, hypocomplementemia, elevated inflammatory markers, leukopenia, proteinuria, SSA/SSB, RNP polymerase III  Currently with improvement in symptoms with Benlysta  - repeat labs prior to follow up - c/w SC Benlysta - c/w HCQ 300mg daily based on her weight - taper prednisone to 5mg daily - opthalmology evaluation  - will avoid MTX in child bearing age female - renal biopsy scheduled for 8/10 - reviewed risk and benefits of medications - encouraged compliance with medications and follow up visits  Discussed treatment plan with the patient. The patient was given the opportunity to ask questions and all questions were answered to their satisfaction.

## 2023-08-03 NOTE — HISTORY OF PRESENT ILLNESS
[FreeTextEntry1] : Pt presenting today for a f.u visit for SLE/ sjogrens. Last seen 06/2023. Chart reviewed since . Currently on SC Benlysta, HCQ 200mg daily, prednisone 10mg daily. Tolerating medication well. Denies side effects. Does report to have improvement in symptoms with the addition of Benlysta thus far.  Still has intermittent rash and itching. Joint pain, oral ulcers, hair loss- improved.  Denies fever, chills, CP, SOB, abd pain, blood in urine. On birth control.  Planned for renal biopsy 8/10.  ROS otherwise unchanged from .

## 2023-08-10 ENCOUNTER — TRANSCRIPTION ENCOUNTER (OUTPATIENT)
Age: 28
End: 2023-08-10

## 2023-08-10 ENCOUNTER — OUTPATIENT (OUTPATIENT)
Dept: OUTPATIENT SERVICES | Facility: HOSPITAL | Age: 28
LOS: 1 days | End: 2023-08-10
Payer: MEDICAID

## 2023-08-10 ENCOUNTER — RESULT REVIEW (OUTPATIENT)
Age: 28
End: 2023-08-10

## 2023-08-10 VITALS
WEIGHT: 123.02 LBS | OXYGEN SATURATION: 100 % | TEMPERATURE: 98 F | SYSTOLIC BLOOD PRESSURE: 116 MMHG | DIASTOLIC BLOOD PRESSURE: 81 MMHG | HEART RATE: 99 BPM | HEIGHT: 64 IN | RESPIRATION RATE: 16 BRPM

## 2023-08-10 DIAGNOSIS — N04.9 NEPHROTIC SYNDROME WITH UNSPECIFIED MORPHOLOGIC CHANGES: ICD-10-CM

## 2023-08-10 DIAGNOSIS — N63.10 UNSPECIFIED LUMP IN THE RIGHT BREAST, UNSPECIFIED QUADRANT: Chronic | ICD-10-CM

## 2023-08-10 LAB — HCG UR QL: NEGATIVE — SIGNIFICANT CHANGE UP

## 2023-08-10 PROCEDURE — 88348 ELECTRON MICROSCOPY DX: CPT | Mod: 26

## 2023-08-10 PROCEDURE — 88346 IMFLUOR 1ST 1ANTB STAIN PX: CPT

## 2023-08-10 PROCEDURE — 80053 COMPREHEN METABOLIC PANEL: CPT

## 2023-08-10 PROCEDURE — 76942 ECHO GUIDE FOR BIOPSY: CPT

## 2023-08-10 PROCEDURE — 88305 TISSUE EXAM BY PATHOLOGIST: CPT | Mod: 26

## 2023-08-10 PROCEDURE — 50200 RENAL BIOPSY PERQ: CPT | Mod: RT

## 2023-08-10 PROCEDURE — 88313 SPECIAL STAINS GROUP 2: CPT | Mod: 26

## 2023-08-10 PROCEDURE — 88348 ELECTRON MICROSCOPY DX: CPT

## 2023-08-10 PROCEDURE — 88350 IMFLUOR EA ADDL 1ANTB STN PX: CPT | Mod: 26

## 2023-08-10 PROCEDURE — 81025 URINE PREGNANCY TEST: CPT

## 2023-08-10 PROCEDURE — 88305 TISSUE EXAM BY PATHOLOGIST: CPT

## 2023-08-10 PROCEDURE — 36415 COLL VENOUS BLD VENIPUNCTURE: CPT

## 2023-08-10 PROCEDURE — 88313 SPECIAL STAINS GROUP 2: CPT

## 2023-08-10 PROCEDURE — 88346 IMFLUOR 1ST 1ANTB STAIN PX: CPT | Mod: 26

## 2023-08-10 PROCEDURE — 88350 IMFLUOR EA ADDL 1ANTB STN PX: CPT

## 2023-08-10 PROCEDURE — 76942 ECHO GUIDE FOR BIOPSY: CPT | Mod: 26

## 2023-08-10 RX ORDER — ACETAMINOPHEN 500 MG
650 TABLET ORAL ONCE
Refills: 0 | Status: DISCONTINUED | OUTPATIENT
Start: 2023-08-10 | End: 2023-08-24

## 2023-08-10 NOTE — ASU DISCHARGE PLAN (ADULT/PEDIATRIC) - ASU DC SPECIAL INSTRUCTIONSFT
Biopsy Discharge    Discharge Instructions  - You have had a biopsy of your right kidney.   - You may shower in 24 hours. No soaking or swimming until the site is completely healed.  - Keep the area covered and dry for the next 24 hours.  - Do not perform any heavy lifting for the next few days or until the site is healed.  - You may resume your normal diet.  - You may resume your normal medications however you should wait 48 hours before restarting aspirin, plavix, or blood thinners.  - It is normal to experience some pain over the site for the next few days. You may take apply ice to the area (20 minutes on, 20 minutes off) and take Tylenol for that pain. Do not take more frequently than every 6 hours and do not exceed more than 3000mg of Tylenol in a 24 hour period.    - You were given conscious sedation which may make you drowsy, therefore you need someone to stay with you until the morning following the procedure.  - Do not drive, engage in heavy lifting or strenuous activity, or drink any alcoholic beverages for the next 24 hours.   - You may resume normal activity in 24 hours.    Notify your primary physician and/or Interventional Radiology IMMEDIATELY if you experience any of the following       - Fever of 101F or 38C       - Chills or Rigors/ Shakes       - Swelling and/or Redness in the area around the biopsy site       - Worsening Pain       - Blood soaked bandages or worsening bleeding       - Lightheadedness and/or dizziness upon standing       - Chest Pain/ Tightness       - Shortness of Breath       - Difficulty walking    If you have a problem that you believe requires IMMEDIATE attention, please go to your NEAREST Emergency Room. If you believe your problem can safely wait until you speak to a physician, please call Interventional Radiology for any concerns.    During Normal Weekday Business Hours- You can contact the Interventional Radiology department during normal business hours via telephone.  During Evenings and Weekends- If you need to contact Interventional Radiology during off hours, do so by calling the hospital and requesting to be connected to the Interventional Radiologist on call.

## 2023-08-10 NOTE — ASU DISCHARGE PLAN (ADULT/PEDIATRIC) - NS MD DC FALL RISK RISK
For information on Fall & Injury Prevention, visit: [email or web address] OR  [email or web address] OR  https://www.cdc.gov/steadi/patient.html

## 2023-08-10 NOTE — PROGRESS NOTE ADULT - SUBJECTIVE AND OBJECTIVE BOX
IR Post Procedure Note    Diagnosis: Proteinuria    Procedure: Random Renal Biopsy    : Hernandez Lee MD    Contrast: None    Anesthesia: 1% Lidocaine Subcutaneous, Sedation administered by Anesthesiology    Estimated Blood Loss: Less than 10cc    Specimens: Specimens identified, labeled, confirmed and sent to lab. Adequacy of sample confirmed by Cytopathology Team.    Complications: Small Hematoma    Anticoagulation: Resume in 48 Hours    Findings & Plan: 3 18g core bx of Right kidney obtained w 18g needle under US guidance. Sample adequacy confirmed by cytopathology. Small hematoma on post procedure US.      Please call Interventional Radiology with any questions, concerns, or issues.

## 2023-08-17 LAB — SURGICAL PATHOLOGY STUDY: SIGNIFICANT CHANGE UP

## 2023-08-21 ENCOUNTER — RX RENEWAL (OUTPATIENT)
Age: 28
End: 2023-08-21

## 2023-08-21 ENCOUNTER — INPATIENT (INPATIENT)
Facility: HOSPITAL | Age: 28
LOS: 2 days | Discharge: HOME | DRG: 176 | End: 2023-08-24
Attending: INTERNAL MEDICINE | Admitting: HOSPITALIST
Payer: MEDICAID

## 2023-08-21 VITALS
HEART RATE: 150 BPM | WEIGHT: 123.02 LBS | HEIGHT: 64 IN | RESPIRATION RATE: 18 BRPM | TEMPERATURE: 98 F | OXYGEN SATURATION: 100 % | SYSTOLIC BLOOD PRESSURE: 117 MMHG | DIASTOLIC BLOOD PRESSURE: 79 MMHG

## 2023-08-21 DIAGNOSIS — I26.99 OTHER PULMONARY EMBOLISM WITHOUT ACUTE COR PULMONALE: ICD-10-CM

## 2023-08-21 DIAGNOSIS — N63.10 UNSPECIFIED LUMP IN THE RIGHT BREAST, UNSPECIFIED QUADRANT: Chronic | ICD-10-CM

## 2023-08-21 LAB
ALBUMIN SERPL ELPH-MCNC: 2.2 G/DL — LOW (ref 3.3–5.2)
ALP SERPL-CCNC: 64 U/L — SIGNIFICANT CHANGE UP (ref 40–120)
ALT FLD-CCNC: 7 U/L — SIGNIFICANT CHANGE UP
ANION GAP SERPL CALC-SCNC: 16 MMOL/L — SIGNIFICANT CHANGE UP (ref 5–17)
APTT BLD: 29.4 SEC — SIGNIFICANT CHANGE UP (ref 24.5–35.6)
AST SERPL-CCNC: 17 U/L — SIGNIFICANT CHANGE UP
BASOPHILS # BLD AUTO: 0.03 K/UL — SIGNIFICANT CHANGE UP (ref 0–0.2)
BASOPHILS NFR BLD AUTO: 0.2 % — SIGNIFICANT CHANGE UP (ref 0–2)
BILIRUB SERPL-MCNC: <0.2 MG/DL — LOW (ref 0.4–2)
BUN SERPL-MCNC: 17.7 MG/DL — SIGNIFICANT CHANGE UP (ref 8–20)
CALCIUM SERPL-MCNC: 8.8 MG/DL — SIGNIFICANT CHANGE UP (ref 8.4–10.5)
CHLORIDE SERPL-SCNC: 101 MMOL/L — SIGNIFICANT CHANGE UP (ref 96–108)
CO2 SERPL-SCNC: 22 MMOL/L — SIGNIFICANT CHANGE UP (ref 22–29)
CREAT SERPL-MCNC: 0.91 MG/DL — SIGNIFICANT CHANGE UP (ref 0.5–1.3)
EGFR: 89 ML/MIN/1.73M2 — SIGNIFICANT CHANGE UP
EOSINOPHIL # BLD AUTO: 0.03 K/UL — SIGNIFICANT CHANGE UP (ref 0–0.5)
EOSINOPHIL NFR BLD AUTO: 0.2 % — SIGNIFICANT CHANGE UP (ref 0–6)
GLUCOSE SERPL-MCNC: 91 MG/DL — SIGNIFICANT CHANGE UP (ref 70–99)
HCG SERPL-ACNC: <4 MIU/ML — SIGNIFICANT CHANGE UP
HCT VFR BLD CALC: 27.3 % — LOW (ref 34.5–45)
HGB BLD-MCNC: 8.9 G/DL — LOW (ref 11.5–15.5)
IMM GRANULOCYTES NFR BLD AUTO: 0.8 % — SIGNIFICANT CHANGE UP (ref 0–0.9)
INR BLD: 0.95 RATIO — SIGNIFICANT CHANGE UP (ref 0.85–1.18)
LIDOCAIN IGE QN: 23 U/L — SIGNIFICANT CHANGE UP (ref 22–51)
LYMPHOCYTES # BLD AUTO: 15.5 % — SIGNIFICANT CHANGE UP (ref 13–44)
LYMPHOCYTES # BLD AUTO: 2.1 K/UL — SIGNIFICANT CHANGE UP (ref 1–3.3)
MAGNESIUM SERPL-MCNC: 1.4 MG/DL — LOW (ref 1.6–2.6)
MCHC RBC-ENTMCNC: 30 PG — SIGNIFICANT CHANGE UP (ref 27–34)
MCHC RBC-ENTMCNC: 32.6 GM/DL — SIGNIFICANT CHANGE UP (ref 32–36)
MCV RBC AUTO: 91.9 FL — SIGNIFICANT CHANGE UP (ref 80–100)
MONOCYTES # BLD AUTO: 0.72 K/UL — SIGNIFICANT CHANGE UP (ref 0–0.9)
MONOCYTES NFR BLD AUTO: 5.3 % — SIGNIFICANT CHANGE UP (ref 2–14)
NEUTROPHILS # BLD AUTO: 10.6 K/UL — HIGH (ref 1.8–7.4)
NEUTROPHILS NFR BLD AUTO: 78 % — HIGH (ref 43–77)
NT-PROBNP SERPL-SCNC: 919 PG/ML — HIGH (ref 0–300)
PLATELET # BLD AUTO: 395 K/UL — SIGNIFICANT CHANGE UP (ref 150–400)
POTASSIUM SERPL-MCNC: 4.3 MMOL/L — SIGNIFICANT CHANGE UP (ref 3.5–5.3)
POTASSIUM SERPL-SCNC: 4.3 MMOL/L — SIGNIFICANT CHANGE UP (ref 3.5–5.3)
PROT SERPL-MCNC: 5.8 G/DL — LOW (ref 6.6–8.7)
PROTHROM AB SERPL-ACNC: 10.6 SEC — SIGNIFICANT CHANGE UP (ref 9.5–13)
RBC # BLD: 2.97 M/UL — LOW (ref 3.8–5.2)
RBC # FLD: 12.5 % — SIGNIFICANT CHANGE UP (ref 10.3–14.5)
SODIUM SERPL-SCNC: 139 MMOL/L — SIGNIFICANT CHANGE UP (ref 135–145)
TROPONIN T SERPL-MCNC: <0.01 NG/ML — SIGNIFICANT CHANGE UP (ref 0–0.06)
TROPONIN T SERPL-MCNC: <0.01 NG/ML — SIGNIFICANT CHANGE UP (ref 0–0.06)
WBC # BLD: 13.59 K/UL — HIGH (ref 3.8–10.5)
WBC # FLD AUTO: 13.59 K/UL — HIGH (ref 3.8–10.5)

## 2023-08-21 PROCEDURE — 71275 CT ANGIOGRAPHY CHEST: CPT | Mod: 26,MA

## 2023-08-21 PROCEDURE — 99233 SBSQ HOSP IP/OBS HIGH 50: CPT

## 2023-08-21 PROCEDURE — 99223 1ST HOSP IP/OBS HIGH 75: CPT

## 2023-08-21 PROCEDURE — 74177 CT ABD & PELVIS W/CONTRAST: CPT | Mod: 26,MG

## 2023-08-21 PROCEDURE — 71045 X-RAY EXAM CHEST 1 VIEW: CPT | Mod: 26

## 2023-08-21 PROCEDURE — 99291 CRITICAL CARE FIRST HOUR: CPT

## 2023-08-21 PROCEDURE — G1004: CPT

## 2023-08-21 RX ORDER — SPIRONOLACTONE 25 MG/1
1 TABLET, FILM COATED ORAL
Refills: 0 | DISCHARGE

## 2023-08-21 RX ORDER — CEFTRIAXONE 500 MG/1
1000 INJECTION, POWDER, FOR SOLUTION INTRAMUSCULAR; INTRAVENOUS ONCE
Refills: 0 | Status: DISCONTINUED | OUTPATIENT
Start: 2023-08-21 | End: 2023-08-21

## 2023-08-21 RX ORDER — ACETAMINOPHEN 500 MG
650 TABLET ORAL EVERY 6 HOURS
Refills: 0 | Status: DISCONTINUED | OUTPATIENT
Start: 2023-08-21 | End: 2023-08-24

## 2023-08-21 RX ORDER — ASPIRIN/CALCIUM CARB/MAGNESIUM 324 MG
324 TABLET ORAL ONCE
Refills: 0 | Status: COMPLETED | OUTPATIENT
Start: 2023-08-21 | End: 2023-08-21

## 2023-08-21 RX ORDER — MORPHINE SULFATE 50 MG/1
2 CAPSULE, EXTENDED RELEASE ORAL
Refills: 0 | Status: DISCONTINUED | OUTPATIENT
Start: 2023-08-21 | End: 2023-08-24

## 2023-08-21 RX ORDER — SODIUM CHLORIDE 9 MG/ML
1000 INJECTION INTRAMUSCULAR; INTRAVENOUS; SUBCUTANEOUS ONCE
Refills: 0 | Status: COMPLETED | OUTPATIENT
Start: 2023-08-21 | End: 2023-08-21

## 2023-08-21 RX ORDER — HEPARIN SODIUM 5000 [USP'U]/ML
2000 INJECTION INTRAVENOUS; SUBCUTANEOUS EVERY 6 HOURS
Refills: 0 | Status: DISCONTINUED | OUTPATIENT
Start: 2023-08-21 | End: 2023-08-24

## 2023-08-21 RX ORDER — HYDROXYCHLOROQUINE SULFATE 200 MG
1 TABLET ORAL
Refills: 0 | DISCHARGE

## 2023-08-21 RX ORDER — HEPARIN SODIUM 5000 [USP'U]/ML
INJECTION INTRAVENOUS; SUBCUTANEOUS
Qty: 25000 | Refills: 0 | Status: DISCONTINUED | OUTPATIENT
Start: 2023-08-21 | End: 2023-08-22

## 2023-08-21 RX ORDER — CEFTRIAXONE 500 MG/1
1000 INJECTION, POWDER, FOR SOLUTION INTRAMUSCULAR; INTRAVENOUS ONCE
Refills: 0 | Status: COMPLETED | OUTPATIENT
Start: 2023-08-21 | End: 2023-08-21

## 2023-08-21 RX ORDER — MAGNESIUM SULFATE 500 MG/ML
2 VIAL (ML) INJECTION ONCE
Refills: 0 | Status: COMPLETED | OUTPATIENT
Start: 2023-08-21 | End: 2023-08-21

## 2023-08-21 RX ORDER — LANOLIN ALCOHOL/MO/W.PET/CERES
3 CREAM (GRAM) TOPICAL AT BEDTIME
Refills: 0 | Status: DISCONTINUED | OUTPATIENT
Start: 2023-08-21 | End: 2023-08-24

## 2023-08-21 RX ORDER — HEPARIN SODIUM 5000 [USP'U]/ML
4500 INJECTION INTRAVENOUS; SUBCUTANEOUS ONCE
Refills: 0 | Status: COMPLETED | OUTPATIENT
Start: 2023-08-21 | End: 2023-08-21

## 2023-08-21 RX ORDER — FUROSEMIDE 40 MG
1 TABLET ORAL
Refills: 0 | DISCHARGE

## 2023-08-21 RX ORDER — AZITHROMYCIN 500 MG/1
500 TABLET, FILM COATED ORAL ONCE
Refills: 0 | Status: COMPLETED | OUTPATIENT
Start: 2023-08-21 | End: 2023-08-21

## 2023-08-21 RX ORDER — HEPARIN SODIUM 5000 [USP'U]/ML
4500 INJECTION INTRAVENOUS; SUBCUTANEOUS EVERY 6 HOURS
Refills: 0 | Status: DISCONTINUED | OUTPATIENT
Start: 2023-08-21 | End: 2023-08-24

## 2023-08-21 RX ORDER — SERTRALINE 25 MG/1
1 TABLET, FILM COATED ORAL
Refills: 0 | DISCHARGE

## 2023-08-21 RX ORDER — ONDANSETRON 8 MG/1
4 TABLET, FILM COATED ORAL EVERY 8 HOURS
Refills: 0 | Status: DISCONTINUED | OUTPATIENT
Start: 2023-08-21 | End: 2023-08-24

## 2023-08-21 RX ORDER — TRAMADOL HYDROCHLORIDE 50 MG/1
50 TABLET ORAL EVERY 6 HOURS
Refills: 0 | Status: DISCONTINUED | OUTPATIENT
Start: 2023-08-21 | End: 2023-08-24

## 2023-08-21 RX ORDER — METOPROLOL TARTRATE 50 MG
1 TABLET ORAL
Refills: 0 | DISCHARGE

## 2023-08-21 RX ADMIN — SODIUM CHLORIDE 1000 MILLILITER(S): 9 INJECTION INTRAMUSCULAR; INTRAVENOUS; SUBCUTANEOUS at 16:57

## 2023-08-21 RX ADMIN — TRAMADOL HYDROCHLORIDE 50 MILLIGRAM(S): 50 TABLET ORAL at 23:49

## 2023-08-21 RX ADMIN — Medication 324 MILLIGRAM(S): at 16:57

## 2023-08-21 RX ADMIN — ONDANSETRON 4 MILLIGRAM(S): 8 TABLET, FILM COATED ORAL at 23:49

## 2023-08-21 RX ADMIN — CEFTRIAXONE 1000 MILLIGRAM(S): 500 INJECTION, POWDER, FOR SOLUTION INTRAMUSCULAR; INTRAVENOUS at 22:40

## 2023-08-21 RX ADMIN — AZITHROMYCIN 255 MILLIGRAM(S): 500 TABLET, FILM COATED ORAL at 22:50

## 2023-08-21 RX ADMIN — HEPARIN SODIUM 4500 UNIT(S): 5000 INJECTION INTRAVENOUS; SUBCUTANEOUS at 21:37

## 2023-08-21 RX ADMIN — Medication 25 GRAM(S): at 21:23

## 2023-08-21 RX ADMIN — HEPARIN SODIUM 1100 UNIT(S)/HR: 5000 INJECTION INTRAVENOUS; SUBCUTANEOUS at 21:35

## 2023-08-21 NOTE — ED PROVIDER NOTE - ATTENDING CONTRIBUTION TO CARE
I, Amaya Sims DO, have personally provided 60 minutes of critical care time exclusive of time spent on separately billable procedures. Time includes review of laboratory data, radiology results, discussion with consultants, and monitoring for potential decompensation. Interventions were performed as documented above.     I personally saw the patient with the resident, and completed the key components of the history and physical exam. I then discussed the management plan with the resident.    26 y/o F with PMH SLE, nephrotic syndrome (s/p renal bx 2 weeks ago) presents for sudden onset chest pain with SOB that is worse with exertion, improved with rest. She felt right flank pain left week that has gradually worsened at the site of the bx, but previously had no pain in that area. Denies fevers, chills, nausea, vomiting, abdominal pain, dysuria, hematuria, coughing, LE edema. She is on steroids for her SLE, as well as Benlysta weekly. She does take OCPs.    PE - NAD, well appearing, tachycardic, lungs CTA b/L, abd soft, NT/ND, no CVAT, no LE edema, 2+ symmetrical distal pulses.    Patient high risk for PE - will evaluate for PE with CTA, will also obtain CT A/P, labs and reassess.

## 2023-08-21 NOTE — CONSULT NOTE ADULT - ASSESSMENT
26 y/o F with PMH Lupus/Sjogrens, on oral contraceptives, sinus tachycardia (takes metoprolol baseline, HR usually 120s), Family hx of blood clots who presents to [facility] ED with SOB x 2-3 days with chest pain and severe LE edema starting about 1 month ago. Found with PE, started on heparin GTT, plan to TTE and LE dopplers. NPO after midnight in case of need for further intervention.
28 y/o F with a h/o SLE (on chronic steroids), nephrotic syndrome (s/p right renal biopsy 2 weeks prior), chronic sinus tachycardia (on metoprolol), with:    # Bilateral pulmonary emboli  # IVC thrombus  # Lobar pneumonia  # Right renal hematoma    Patient does not require MICU level of care at this time. Please reconsult as appropriate if her condition should deteriorate.    PE likely provoked by SLE + oral contraceptive use. Currently tolerating room air without work of breathing. BP stable. Sinus tachycardic (HR 110s), however this is reportedly chronic. Possible clot in transit visualized in IVC. Start full anticoagulation with heparin infusion. Ideally would like to avoid thrombolytic given small right kidney hematoma related to recent biopsy, however if she were to experience severe clinical and/or hemodynamic deterioration would administer emergently given risk/benefit profile. Monitor hemodynamics closely, maintain a MAP > 65.    No evidence of RV strain on CT-imaging. Troponin negative x 2, although pro-BNP elevated 900+. F/u TTE to definitively assess RV function. Cardiology input appreciated. Obtain bilateral lower extremity venous dopplers to r/o DVT.    Would start empiric CAP antibiotic coverage for LLL consolidation.      Case discussed with the patient and her mother at the bedside. Diagnosis, prognosis, and management plan outlined. All questions answered and concerns addressed.    Case discussed with MICU physician, Dr. Barajas.      75 minutes accounts for the total time spent on this patient encounter, which includes assessing and addressing the problems and their associated risks as mentioned above, reviewing the medical record/laboratory data/imaging studies, interviewing and physically examining the patient, as well as coordinating care with the multidisciplinary team.

## 2023-08-21 NOTE — ED ADULT NURSE NOTE - OBJECTIVE STATEMENT
PT presents to ED c/o pleuritic chest pain x about 1 week.  Pt with hx Lupus recently had kidney biopsy 8/10 c/o pain to right flank radiating to right shoulder. Spoke with surgeon and nephro sent to ED for eval.  PT states pain worse with walking and deep inspiration. Tachycardia in ED. PT states last lupus flare 3 months ago, stating she is still on steroid taper.  5mg Prednisone at this time. IV placed labs drawn pending results and imaging.

## 2023-08-21 NOTE — CONSULT NOTE ADULT - SUBJECTIVE AND OBJECTIVE BOX
Patient is a 27y old  Female who presents with a chief complaint of     BRIEF HOSPITAL COURSE: 26 y/o F with a h/o SLE (on chronic steroids), nephrotic syndrome (s/p right renal biopsy 2 weeks prior), chronic sinus tachycardia (on metoprolol), presents to the ED with complaints of substernal chest pain and dyspnea over the past few days. CTA C/A/P reveals bilateral segmental PE (L>>R, bilateral lower lobes) with suspected life sided pulmonary infarct, LLL pneumonia, IVC thrombus, and small right renal hematoma. Of note, she is on oral contraceptives.          PAST MEDICAL & SURGICAL HISTORY:  Nephrotic syndrome      SLE (systemic lupus erythematosus)      Proteinuria      Breast mass, right        Allergies    No Known Allergies    Intolerances      FAMILY HISTORY:  FH: type 2 diabetes (Mother, Grandparent)    FH: breast cancer (Grandparent)        Review of Systems:  CONSTITUTIONAL: No fever, chills, or fatigue  EYES: No eye pain, visual disturbances, or discharge  ENMT:  No difficulty hearing, tinnitus, vertigo; No sinus or throat pain  NECK: No pain or stiffness  RESPIRATORY: No cough, wheezing, chills or hemoptysis; (+) shortness of breath  CARDIOVASCULAR: (+) chest pain, no palpitations, dizziness, or leg swelling  GASTROINTESTINAL: No abdominal or epigastric pain. No nausea, vomiting, or hematemesis; No diarrhea or constipation. No melena or hematochezia.  GENITOURINARY: No dysuria, frequency, hematuria, or incontinence  NEUROLOGICAL: No headaches, memory loss, loss of strength, numbness, or tremors  SKIN: No itching, burning, rashes, or lesions   MUSCULOSKELETAL: No joint pain or swelling; No muscle, back, or extremity pain  PSYCHIATRIC: No depression, anxiety, mood swings, or difficulty sleeping        Medications:  azithromycin  IVPB 500 milliGRAM(s) IV Intermittent once  cefTRIAXone Injectable. 1000 milliGRAM(s) IV Push once  heparin   Injectable 4500 Unit(s) IV Push every 6 hours PRN  heparin   Injectable 2000 Unit(s) IV Push every 6 hours PRN  heparin  Infusion.  Unit(s)/Hr IV Continuous <Continuous>      ICU Vital Signs Last 24 Hrs  T(C): 37.1 (21 Aug 2023 16:20), Max: 37.1 (21 Aug 2023 16:20)  T(F): 98.8 (21 Aug 2023 16:20), Max: 98.8 (21 Aug 2023 16:20)  HR: 104 (21 Aug 2023 16:20) (104 - 150)  BP: 105/70 (21 Aug 2023 16:20) (105/70 - 117/79)  BP(mean): --  ABP: --  ABP(mean): --  RR: 18 (21 Aug 2023 16:20) (18 - 18)  SpO2: 100% (21 Aug 2023 16:20) (100% - 100%)    O2 Parameters below as of 21 Aug 2023 16:20  Patient On (Oxygen Delivery Method): room air          Vital Signs Last 24 Hrs  T(C): 37.1 (21 Aug 2023 16:20), Max: 37.1 (21 Aug 2023 16:20)  T(F): 98.8 (21 Aug 2023 16:20), Max: 98.8 (21 Aug 2023 16:20)  HR: 104 (21 Aug 2023 16:20) (104 - 150)  BP: 105/70 (21 Aug 2023 16:20) (105/70 - 117/79)  BP(mean): --  RR: 18 (21 Aug 2023 16:20) (18 - 18)  SpO2: 100% (21 Aug 2023 16:20) (100% - 100%)    Parameters below as of 21 Aug 2023 16:20  Patient On (Oxygen Delivery Method): room air            I&O's Detail        LABS:                        8.9    13.59 )-----------( 395      ( 21 Aug 2023 14:15 )             27.3     08-21    139  |  101  |  17.7  ----------------------------<  91  4.3   |  22.0  |  0.91    Ca    8.8      21 Aug 2023 14:15  Mg     1.4     08-21    TPro  5.8<L>  /  Alb  2.2<L>  /  TBili  <0.2<L>  /  DBili  x   /  AST  17  /  ALT  7   /  AlkPhos  64  08-21      CARDIAC MARKERS ( 21 Aug 2023 21:30 )  x     / <0.01 ng/mL / x     / x     / x      CARDIAC MARKERS ( 21 Aug 2023 14:15 )  x     / <0.01 ng/mL / x     / x     / x          CAPILLARY BLOOD GLUCOSE        PT/INR - ( 21 Aug 2023 14:15 )   PT: 10.6 sec;   INR: 0.95 ratio         PTT - ( 21 Aug 2023 14:15 )  PTT:29.4 sec  Urinalysis Basic - ( 21 Aug 2023 14:15 )    Color: x / Appearance: x / SG: x / pH: x  Gluc: 91 mg/dL / Ketone: x  / Bili: x / Urobili: x   Blood: x / Protein: x / Nitrite: x   Leuk Esterase: x / RBC: x / WBC x   Sq Epi: x / Non Sq Epi: x / Bacteria: x      CULTURES:        Physical Examination:    General: No acute distress.  Alert, oriented, interactive, nonfocal    HEENT: Pupils equal, reactive to light.  Symmetric.    PULM: Clear to auscultation bilaterally, no significant sputum production    CVS: tachycardic, reg rhythm, no murmurs, rubs, or gallops    ABD: Soft, nondistended, nontender, normoactive bowel sounds, no masses    EXT: No edema, nontender    SKIN: Warm and well perfused, no rashes noted.    NEURO: A&Ox3, strength 5/5 all extremities, cranial nerves grossly intact, no focal deficits      RADIOLOGY:     < from: CT Angio Chest PE Protocol w/ IV Cont (08.21.23 @ 17:24) >  FINDINGS:    PULMONARY ARTERIES: Pulmonary embolism within multiple proximal segmental   left lower lobe pulmonary arterial branches. Distal right lower lobe   segmental pulmonary embolism.    MEDIASTINUM: Normal heart size. No pericardial effusion. Thoracic aorta   normal caliber.  No large mediastinal lymph nodes.    AIRWAYS, LUNGS, PLEURA: Central airways clear. Lingular consolidation.   Patchy opacities at the left base. Small bilateral pleural effusions.    ABDOMEN and PELVIS: Multiple left renal hypodense lesions, some are cysts   and others are too small to characterize. No hydronephrosis. Liver,   gallbladder, pancreas, adrenal glands, spleen appear unremarkable.   Unremarkable urinary bladder and uterus.    No bowel obstruction. Small hyperdense collection posterior to right   kidney (image 70, series 11). No evidence of intra-abdominal or pelvic   abscess.    No abdominal or pelvic lymphadenopathy. Abdominal aorta normal caliber.   2.6 x 0.5 cm nonocclusive thrombus identified within the IVC at the level   of the right renal vein and superiorly (sagittal series 3).    BONES: Unremarkable.    SOFT TISSUES: Mildly enlarged bilateral axillary nodes; reference left   axillary 1.6 x 1 cm node (image 1:30, series 6).    IMPRESSION:    Pulmonary embolism within multiple proximal segmental left lower lobe   pulmonary arterial branches.    Lingular consolidation compatible with infarct.    Patchy subpleural opacities may represent atelectasis or consolidation.    Small bilateral pleural effusions.    Nonocclusive thrombus identified within the IVC.    No evidence of intra-abdominal abscess.    Small hyperdense collection posterior to the right kidney is compatible   with a small hematoma post biopsy.

## 2023-08-21 NOTE — CONSULT NOTE ADULT - SUBJECTIVE AND OBJECTIVE BOX
[facility]                                              CARDIOLOGY AT [place] [address]                                             Telephone: [phone]. Fax:[phone]                                                       CARDIOLOGY CONSULTATION NOTE                                                                                             History obtained by: Patient and medical record  Community Cardiologist: none   obtained: Yes [  ] No [x  ]  Reason for Consultation: PE  Available out pt records reviewed: Yes [x  ] No [  ]    Chief complaint:    Patient is a 27y old  Female who presents with a chief complaint of     HPI:  26 y/o F with PMH Lupus/Sjogrens, on oral contraceptives, sinus tachycardia (takes metoprolol baseline, HR usually 120s), Family hx of blood clots who presents to [facility] ED with SOB x 2-3 days with chest pain and severe LE edema starting about 1 month ago. She also endorses abdominal swelling. 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 She recently underwent a renal biopsy with Dr. Henry on 8/10/2023 due to proteinuria. In ED  and CTA for PE was positive for Pulmonary embolism within multiple proximal segmental left lower lobe pulmonary arterial branches. EKG with ST to 130. She currently denies back pain, headache, dizziness,  diaphoresis, syncope or N/V.          CARDIAC TESTING   ECHO:    STRESS:    CATH:     ELECTROPHYSIOLOGY:     PAST MEDICAL HISTORY  No pertinent past medical history  Nephrotic syndrome  SLE (systemic lupus erythematosus)  Proteinuria        PAST SURGICAL HISTORY  No significant past surgical history  Breast mass, right        SOCIAL HISTORY:  Denies smoking/alcohol/drugs  CIGARETTES:     ALCOHOL:  DRUGS:    FAMILY HISTORY:  FH: type 2 diabetes (Mother, Grandparent)  FH: breast cancer (Grandparent)  Grandfather : blood clots      Family History of Cardiovascular Disease:  Yes [  ] No [x  ]  Coronary Artery Disease in first degree relative: Yes [  ] No [  x]  Sudden Cardiac Death in First degree relative: Yes [  ] No [ x ]    HOME MEDICATIONS:  Benlysta 200 mg/mL subcutaneous solution: 200 subcutaneously once a week (10 Aug 2023 07:56)  drospirenone-ethinyl estradiol 3 mg-0.02 mg oral tablet: 1 orally once a day (10 Aug 2023 07:56)  hydroxychloroquine 200 mg oral tablet: 1 orally once a day (10 Aug 2023 07:56)  Lasix 40 mg oral tablet: 1 orally once a day (10 Aug 2023 07:56)  metoprolol tartrate 25 mg oral tablet: 1 orally once a day (10 Aug 2023 07:56)  predniSONE 10 mg oral tablet: 1 orally once a day (10 Aug 2023 07:56)  spironolactone 25 mg oral tablet: 1 orally once a day (10 Aug 2023 07:56)  Zoloft 25 mg oral tablet: 1 orally once a day (10 Aug 2023 07:56)      CURRENT CARDIAC MEDICATIONS:      CURRENT OTHER MEDICATIONS:  heparin   Injectable 4500 Unit(s) IV Push once, Stop order after: 1 Doses  heparin   Injectable 4500 Unit(s) IV Push every 6 hours PRN For aPTT less than 40  heparin   Injectable 2000 Unit(s) IV Push every 6 hours PRN For aPTT between 40 - 57  heparin  Infusion.  Unit(s)/Hr (11 mL/Hr) IV Continuous <Continuous>      ALLERGIES:   No Known Allergies      REVIEW OF SYMPTOMS:   CONSTITUTIONAL: No fever, no chills, no weight loss, no weight gain, no fatigue   ENMT:  No vertigo; No sinus or throat pain  NECK: No pain or stiffness  CARDIOVASCULAR: +chest pain, + dyspnea, no syncope/presyncope, no palpitations, no dizziness, no Orthopnea, no Paroxsymal nocturnal dyspnea  RESPIRATORY: no Shortness of breath, no cough, no wheezing  : No dysuria, no hematuria   GI: No dark color stool, no nausea, no diarrhea, no constipation, no abdominal pain, + bloating  NEURO: No headache, no slurred speech   MUSCULOSKELETAL: No joint pain or swelling; No muscle, back, or extremity pain, + LE edema  PSYCH: No agitation, no anxiety.    ALL OTHER REVIEW OF SYSTEMS ARE NEGATIVE.    VITAL SIGNS:  T(C): 37.1 (08-21-23 @ 16:20), Max: 37.1 (08-21-23 @ 16:20)  T(F): 98.8 (08-21-23 @ 16:20), Max: 98.8 (08-21-23 @ 16:20)  HR: 104 (08-21-23 @ 16:20) (104 - 150)  BP: 105/70 (08-21-23 @ 16:20) (105/70 - 117/79)  RR: 18 (08-21-23 @ 16:20) (18 - 18)  SpO2: 100% (08-21-23 @ 16:20) (100% - 100%)    INTAKE AND OUTPUT:       PHYSICAL EXAM:  Constitutional: Comfortable . No acute distress.   HEENT: Atraumatic and normocephalic , neck is supple . no JVD. No carotid bruit.  CNS: A&Ox3. No focal deficits.   Respiratory: CTAB, unlabored   Cardiovascular: ST normal s1 s2. No murmur. No rubs or gallop.  Gastrointestinal: Soft, non-tender. +Bowel sounds.   Extremities: 2+ Peripheral Pulses, No clubbing, cyanosis, trace edema BLE  Psychiatric: Calm . no agitation.   Skin: Warm and dry, no ulcers on extremities     LABS:  ( 21 Aug 2023 14:15 )  Troponin T  <0.01,  CPK  X    , CKMB  X    , BNP X                                  8.9    13.59 )-----------( 395      ( 21 Aug 2023 14:15 )             27.3     08-21    139  |  101  |  17.7  ----------------------------<  91  4.3   |  22.0  |  0.91    Ca    8.8      21 Aug 2023 14:15  Mg     1.4     08-21    TPro  5.8<L>  /  Alb  2.2<L>  /  TBili  <0.2<L>  /  DBili  x   /  AST  17  /  ALT  7   /  AlkPhos  64  08-21    PT/INR - ( 21 Aug 2023 14:15 )   PT: 10.6 sec;   INR: 0.95 ratio         PTT - ( 21 Aug 2023 14:15 )  PTT:29.4 sec  Urinalysis Basic - ( 21 Aug 2023 14:15 )    Color: x / Appearance: x / SG: x / pH: x  Gluc: 91 mg/dL / Ketone: x  / Bili: x / Urobili: x   Blood: x / Protein: x / Nitrite: x   Leuk Esterase: x / RBC: x / WBC x   Sq Epi: x / Non Sq Epi: x / Bacteria: x              INTERPRETATION OF TELEMETRY:     ECG: ST   Prior ECG: Yes [  ] No [  ]    RADIOLOGY & ADDITIONAL STUDIES:    X-ray:    CT scan:   < from: CT Abdomen and Pelvis w/ IV Cont (08.21.23 @ 17:25) >    IMPRESSION:    Pulmonary embolism within multiple proximal segmental left lower lobe   pulmonary arterial branches.    Lingular consolidation compatible with infarct.    Patchy subpleural opacities may represent atelectasis or consolidation.    Small bilateral pleural effusions.    Nonocclusive thrombus identified within the IVC.    No evidence of intra-abdominal abscess.    Small hyperdense collection posterior to the right kidney is compatible   with a small hematoma post biopsy.    Findings discussed with ED physician taking care of patient on 8/21/2022   at 5:43 PM.    --- End of Report ---      < end of copied text >  MRI:   US:

## 2023-08-21 NOTE — ED ADULT NURSE NOTE - NSFALLUNIVINTERV_ED_ALL_ED
Bed/Stretcher in lowest position, wheels locked, appropriate side rails in place/Call bell, personal items and telephone in reach/Instruct patient to call for assistance before getting out of bed/chair/stretcher/Non-slip footwear applied when patient is off stretcher/[place] to call system/Physically safe environment - no spills, clutter or unnecessary equipment/Purposeful proactive rounding/Room/bathroom lighting operational, light cord in reach

## 2023-08-21 NOTE — H&P ADULT - HISTORY OF PRESENT ILLNESS
28 yo F PMHx of SLE (diagnosed 1 year ago, on prednisone) presented for SOB and chest pain x several days. Pt reported that she had a kidney biopsy on 8/10. Pt began  28 yo F PMHx of SLE (diagnosed 1 year ago, on daily prednisone and Benlysta weekly), Nephrotic syndrome presented for SOB and chest pain x several days. Pt reported that she had a kidney biopsy on 8/10. Pt began experiences right sided flank pain with radiation to the scapula. Over the last few days, noted to have exertional dyspnea and today began having substernal chest pain and tightness [place] with exertion and deep breath. No family history of blood clots, does endorse OCP use, no recent history of travel. No fever, shortness of breath, no recent history of prolonged bed bound status, no nausea/vomiting, no recent sick contacts.     Walks without use of walker or cane, lives with mother, no smoking, ETOH use or illicit drug use, works in health care.    In the ED, tachycardic, labs remarkable for WBC 13.59, H/H 8.9/27.3, Mg 1.4, IN 1.25, CT abd and pelvis with IV contrast: showing PE w/in proximal segmental LLL pulm artery. EKG with sinus tachycardia, no ST-T wave changes. Cardiology consulted.

## 2023-08-21 NOTE — ED ADULT NURSE NOTE - NSICDXPASTMEDICALHX_GEN_ALL_CORE_FT
PAST MEDICAL HISTORY:  Nephrotic syndrome     Proteinuria     SLE (systemic lupus erythematosus)

## 2023-08-21 NOTE — H&P ADULT - NSHPPHYSICALEXAM_GEN_ALL_CORE
T(C): 37.1 (08-21-23 @ 16:20), Max: 37.1 (08-21-23 @ 16:20)  HR: 104 (08-21-23 @ 16:20) (104 - 150)  BP: 105/70 (08-21-23 @ 16:20) (105/70 - 117/79)  RR: 18 (08-21-23 @ 16:20) (18 - 18)  SpO2: 100% (08-21-23 @ 16:20) (100% - 100%)    GENERAL: patient appears well, no acute distress, appropriate, pleasant  EYES: sclera clear, no exudates  ENMT: oropharynx clear without erythema, no exudates, moist mucous membranes  NECK: supple, soft, no thyromegaly noted  LUNGS: good air entry bilaterally, clear to auscultation, symmetric breath sounds, no wheezing or rhonchi appreciated  HEART: soft S1/S2, regular rate and rhythm, no murmurs noted, no lower extremity edema, tachycardia  GASTROINTESTINAL: abdomen is soft, nontender, nondistended, normoactive bowel sounds, no palpable masses  INTEGUMENT: good skin turgor, warm skin, appears well perfused  MUSCULOSKELETAL: no clubbing or cyanosis, no obvious deformity  NEUROLOGIC: awake, alert, oriented x3, good muscle tone in 4 extremities, no obvious sensory deficits  PSYCHIATRIC: mood is good, affect is congruent, linear and logical thought process  HEME/LYMPH: no palpable supraclavicular nodules, no obvious ecchymosis or petechiae

## 2023-08-21 NOTE — CONSULT NOTE ADULT - PROBLEM SELECTOR RECOMMENDATION 9
.  - CTA for PE was positive for Pulmonary embolism within multiple proximal segmental left lower lobe pulmonary arterial branches  - likely provoked in the setting of OCP use and lupus  - will still need hematology workup for further evaluation  -   - Trop negative x 1, continue to trend  - EKG with -130s  - Mg 1.4 -> supplement with 2gm IVPB stat  - pending TTE for evaluation of cardiac function and any valvular abnormalities and assess for RV strain   - Heparin gtt on board, eventual transition to Eliquis if no procedures planned  - Check LE dopplers r/o DVT  - Keep NPO after midnight in case patient needs further intervention in AM  - Plan Discussed with Dr. Shah .  - CTA for PE was positive for Pulmonary embolism within multiple proximal segmental left lower lobe pulmonary arterial branches  - likely provoked in the setting of OCP use and lupus  - will still need hematology workup for further evaluation  -   - Trop negative x 1, continue to trend  - EKG with -130s  - Mg 1.4 -> supplement with 2gm IVPB stat  - pending TTE for evaluation of cardiac function and any valvular abnormalities and assess for RV strain   - Heparin gtt on board, eventual transition to Eliquis if no procedures planned  - Check LE dopplers r/o DVT  - Keep NPO after midnight in case patient needs further intervention in AM  - Continue home medications  - Plan Discussed with Dr. Shah

## 2023-08-21 NOTE — ED PROVIDER NOTE - OBJECTIVE STATEMENT
Patient is a 27-year-old female with past medical history of lupus who presents to the ED complaining of chest pain.  Patient states she recently had a right-sided kidney biopsy on August 10 due to proteinuria secondary to her lupus, about a week later started having right-sided flank pain radiating up to her arm.  This morning she woke up with tight substernal chest pain, worse with movements and worse with deep breaths.  Denies any history of blood clots, endorses OCP use, denies recent travel.  Denies fever, cough, abdominal pain, nausea, vomiting, dizziness, lightheadedness.

## 2023-08-21 NOTE — H&P ADULT - ASSESSMENT
28 yo F PMHx of SLE (diagnosed 1 year ago, on daily prednisone and Benlysta weekly), Nephrotic syndrome presented for SOB and chest pain x several days. Imaging showing a PE. Admitted for treatment of PE.     #PE   admit to stepdown   started on heparin drip in the ED  PTT q6h via protocol  cardiac monitoring  SPO2 monitoring, goal SPO2>/=92%  ECHO done, follow up reading  Order LE US  will need to be transitioned over to oral anticoagulation  Will need coagulopathy workup outpatient  PERT team is aware of patient  Cardiology following     #SLE  continued with prednisone 5 mg daily  Pt is on weekly Benlysta    DVT ppx: heparin gtt 28 yo F PMHx of SLE (diagnosed 1 year ago, on daily prednisone and Benlysta weekly), Nephrotic syndrome presented for SOB and chest pain x several days. Imaging showing a PE and patchy opacity in the left base. Admitted for treatment of PE.     #PE   admit to stepdown   started on heparin drip in the ED  PTT q6h via protocol  cardiac monitoring  SPO2 monitoring, goal SPO2>/=92%  ECHO done, follow up reading  Order LE US  will need to be transitioned over to oral anticoagulation  Will need coagulopathy workup outpatient  PERT team is aware of patient  Cardiology following     #CAP  left infiltrated noted on imaging  pleuritic chest pain could also be related to this in addition to PE, with leukocytosis  c/w ceftriaxone and azithromycin IV    #SLE  continued with prednisone 5 mg daily  Pt is on weekly Benlysta      DVT ppx: heparin gtt

## 2023-08-21 NOTE — ED ADULT NURSE NOTE - NURSING NEURO ORIENTATION
Addended by: NIA HERNANDEZ on: 10/21/2019 08:26 AM     Modules accepted: Lulu Rick     oriented to person, place and time

## 2023-08-21 NOTE — ED ADULT TRIAGE NOTE - CHIEF COMPLAINT QUOTE
pt c/o mid chest pain, also was here for kidney Bipopsy, was told to come in for the pain  A&Ox3, resp wnl, + SOB, , was 170's at home HX LUPUS

## 2023-08-21 NOTE — ED PROVIDER NOTE - CHIEF COMPLAINT
The patient is a 27y Female complaining of chest pain. Tranexamic Acid Counseling:  Patient advised of the small risk of bleeding problems with tranexamic acid. They were also instructed to call if they developed any nausea, vomiting or diarrhea. All of the patient's questions and concerns were addressed.

## 2023-08-21 NOTE — ED PROVIDER NOTE - CLINICAL SUMMARY MEDICAL DECISION MAKING FREE TEXT BOX
Patient is a 27-year-old female with past medical history of lupus who presents to the ED complaining of chest pain. High suspicion for PE due to hypercoagulable state with lupus and OCP use. Will do cardiac work up and scan for PE. Patient is a 27-year-old female with past medical history of lupus who presents to the ED complaining of chest pain. High suspicion for PE due to hypercoagulable state with lupus and OCP use. Will do cardiac work up and scan for PE.    Thiago FISH: Patient seen by cardiology and MICU, heparin started, cleared for stepdown. PE team aware. Will start abx for possible pneumonia.

## 2023-08-22 DIAGNOSIS — I26.99 OTHER PULMONARY EMBOLISM WITHOUT ACUTE COR PULMONALE: ICD-10-CM

## 2023-08-22 LAB
ALBUMIN SERPL ELPH-MCNC: 1.8 G/DL — LOW (ref 3.3–5.2)
ALP SERPL-CCNC: 61 U/L — SIGNIFICANT CHANGE UP (ref 40–120)
ALT FLD-CCNC: <5 U/L — SIGNIFICANT CHANGE UP
ANION GAP SERPL CALC-SCNC: 12 MMOL/L — SIGNIFICANT CHANGE UP (ref 5–17)
APTT BLD: 49.4 SEC — HIGH (ref 24.5–35.6)
APTT BLD: 50.4 SEC — HIGH (ref 24.5–35.6)
APTT BLD: 60.6 SEC — HIGH (ref 24.5–35.6)
AST SERPL-CCNC: 12 U/L — SIGNIFICANT CHANGE UP
BILIRUB SERPL-MCNC: <0.2 MG/DL — LOW (ref 0.4–2)
BUN SERPL-MCNC: 15.9 MG/DL — SIGNIFICANT CHANGE UP (ref 8–20)
CALCIUM SERPL-MCNC: 8.1 MG/DL — LOW (ref 8.4–10.5)
CHLORIDE SERPL-SCNC: 104 MMOL/L — SIGNIFICANT CHANGE UP (ref 96–108)
CO2 SERPL-SCNC: 22 MMOL/L — SIGNIFICANT CHANGE UP (ref 22–29)
CREAT SERPL-MCNC: 0.98 MG/DL — SIGNIFICANT CHANGE UP (ref 0.5–1.3)
EGFR: 82 ML/MIN/1.73M2 — SIGNIFICANT CHANGE UP
GLUCOSE SERPL-MCNC: 94 MG/DL — SIGNIFICANT CHANGE UP (ref 70–99)
HCT VFR BLD CALC: 25.2 % — LOW (ref 34.5–45)
HCT VFR BLD CALC: 25.2 % — LOW (ref 34.5–45)
HGB BLD-MCNC: 7.9 G/DL — LOW (ref 11.5–15.5)
HGB BLD-MCNC: 8.1 G/DL — LOW (ref 11.5–15.5)
MCHC RBC-ENTMCNC: 29.7 PG — SIGNIFICANT CHANGE UP (ref 27–34)
MCHC RBC-ENTMCNC: 30 PG — SIGNIFICANT CHANGE UP (ref 27–34)
MCHC RBC-ENTMCNC: 31.3 GM/DL — LOW (ref 32–36)
MCHC RBC-ENTMCNC: 32.1 GM/DL — SIGNIFICANT CHANGE UP (ref 32–36)
MCV RBC AUTO: 93.3 FL — SIGNIFICANT CHANGE UP (ref 80–100)
MCV RBC AUTO: 94.7 FL — SIGNIFICANT CHANGE UP (ref 80–100)
PLATELET # BLD AUTO: 395 K/UL — SIGNIFICANT CHANGE UP (ref 150–400)
PLATELET # BLD AUTO: 422 K/UL — HIGH (ref 150–400)
POTASSIUM SERPL-MCNC: 4.7 MMOL/L — SIGNIFICANT CHANGE UP (ref 3.5–5.3)
POTASSIUM SERPL-SCNC: 4.7 MMOL/L — SIGNIFICANT CHANGE UP (ref 3.5–5.3)
PROT SERPL-MCNC: 5.4 G/DL — LOW (ref 6.6–8.7)
RBC # BLD: 2.66 M/UL — LOW (ref 3.8–5.2)
RBC # BLD: 2.7 M/UL — LOW (ref 3.8–5.2)
RBC # FLD: 12.6 % — SIGNIFICANT CHANGE UP (ref 10.3–14.5)
RBC # FLD: 12.7 % — SIGNIFICANT CHANGE UP (ref 10.3–14.5)
SODIUM SERPL-SCNC: 138 MMOL/L — SIGNIFICANT CHANGE UP (ref 135–145)
WBC # BLD: 10.26 K/UL — SIGNIFICANT CHANGE UP (ref 3.8–10.5)
WBC # BLD: 7.59 K/UL — SIGNIFICANT CHANGE UP (ref 3.8–10.5)
WBC # FLD AUTO: 10.26 K/UL — SIGNIFICANT CHANGE UP (ref 3.8–10.5)
WBC # FLD AUTO: 7.59 K/UL — SIGNIFICANT CHANGE UP (ref 3.8–10.5)

## 2023-08-22 PROCEDURE — 93970 EXTREMITY STUDY: CPT | Mod: 26

## 2023-08-22 PROCEDURE — 99233 SBSQ HOSP IP/OBS HIGH 50: CPT

## 2023-08-22 PROCEDURE — 99222 1ST HOSP IP/OBS MODERATE 55: CPT

## 2023-08-22 RX ORDER — HEPARIN SODIUM 5000 [USP'U]/ML
1200 INJECTION INTRAVENOUS; SUBCUTANEOUS
Qty: 25000 | Refills: 0 | Status: DISCONTINUED | OUTPATIENT
Start: 2023-08-22 | End: 2023-08-24

## 2023-08-22 RX ORDER — CEFTRIAXONE 500 MG/1
1000 INJECTION, POWDER, FOR SOLUTION INTRAMUSCULAR; INTRAVENOUS EVERY 24 HOURS
Refills: 0 | Status: DISCONTINUED | OUTPATIENT
Start: 2023-08-22 | End: 2023-08-22

## 2023-08-22 RX ORDER — AZITHROMYCIN 500 MG/1
500 TABLET, FILM COATED ORAL EVERY 24 HOURS
Refills: 0 | Status: DISCONTINUED | OUTPATIENT
Start: 2023-08-22 | End: 2023-08-22

## 2023-08-22 RX ADMIN — TRAMADOL HYDROCHLORIDE 50 MILLIGRAM(S): 50 TABLET ORAL at 00:29

## 2023-08-22 RX ADMIN — HEPARIN SODIUM 1200 UNIT(S)/HR: 5000 INJECTION INTRAVENOUS; SUBCUTANEOUS at 19:36

## 2023-08-22 RX ADMIN — TRAMADOL HYDROCHLORIDE 50 MILLIGRAM(S): 50 TABLET ORAL at 05:38

## 2023-08-22 RX ADMIN — HEPARIN SODIUM 1200 UNIT(S)/HR: 5000 INJECTION INTRAVENOUS; SUBCUTANEOUS at 13:35

## 2023-08-22 RX ADMIN — HEPARIN SODIUM 2000 UNIT(S): 5000 INJECTION INTRAVENOUS; SUBCUTANEOUS at 05:42

## 2023-08-22 RX ADMIN — HEPARIN SODIUM 1200 UNIT(S)/HR: 5000 INJECTION INTRAVENOUS; SUBCUTANEOUS at 07:43

## 2023-08-22 RX ADMIN — HEPARIN SODIUM 1200 UNIT(S)/HR: 5000 INJECTION INTRAVENOUS; SUBCUTANEOUS at 05:39

## 2023-08-22 RX ADMIN — TRAMADOL HYDROCHLORIDE 50 MILLIGRAM(S): 50 TABLET ORAL at 06:42

## 2023-08-22 RX ADMIN — Medication 1 MILLIGRAM(S): at 05:39

## 2023-08-22 NOTE — PHARMACOTHERAPY INTERVENTION NOTE - COMMENTS
Recommended to order a Legionella urinary antigen since patient has been empirically started on azithromycin for the treatment of pneumonia.    Bonifacio Cornell, PharmD, BCIDP  Clinical Pharmacy Specialist, Infectious Diseases  Tele-Antimicrobial Stewardship Program (Tele-ASP)  Tele-ASP Phone: [phone]

## 2023-08-22 NOTE — PROGRESS NOTE ADULT - ASSESSMENT
28 yo F PMHx of SLE (diagnosed 1 year ago, on daily prednisone and Benlysta weekly), Nephrotic syndrome presented for SOB and chest pain x several days. Imaging showing a PE and patchy opacity in the left base. Admitted for treatment of PE.     Acute PE   - c/w heparin gtt   - tele monitoring, HR elevated but pt reports this has been present prior to this episode   - SpO2 monitoring, goal SPO2>/=92%  - ECHO reviewed, no RV strain   - LE US with no DVT   - Appreciate Cardio consult, recommend heparin gtt another 24 hrs and then repeat US to evaluate renal hematoma   - If renal hematoma stable, can transition to NOAC   - Will need coagulopathy workup outpatient     CAP   - left infiltrate noted on imaging   - c/w ceftriaxone and azithromycin IV, transition to PO on DC to complete course      SLE   - continue prednisone 5 mg daily   - on weekly Benlysta     DVT ppx: heparin gtt    28 yo F PMHx of SLE (diagnosed 1 year ago, on daily prednisone and Benlysta weekly), Nephrotic syndrome presented for SOB and chest pain x several days. Imaging showing a PE and patchy opacity in the left base. Admitted for treatment of PE.     Acute PE   - c/w heparin gtt   - tele monitoring, HR elevated but pt reports this has been present prior to this episode   - SpO2 monitoring, goal SPO2>/=92%  - ECHO reviewed, no RV strain   - LE US with no DVT   - Appreciate Cardio consult, recommend heparin gtt another 24 hrs and then repeat US to evaluate renal hematoma   - If renal hematoma stable, can transition to NOAC   - Will need coagulopathy workup outpatient     Renal hematoma post biopsy   - CT A/P: Small hyperdense collection posterior to the right kidney is compatible with a small hematoma post biopsy.  - Repeat us tomorrow to evaluate     CAP unlikely  - CT Chest reviewed and findings most likely due to PE and atelectasis   - DC antibiotics and monitor for now      SLE   - continue prednisone 5 mg daily   - on weekly Benlysta     DVT ppx: heparin gtt

## 2023-08-23 DIAGNOSIS — M32.14 GLOMERULAR DISEASE IN SYSTEMIC LUPUS ERYTHEMATOSUS: ICD-10-CM

## 2023-08-23 LAB
ANION GAP SERPL CALC-SCNC: 12 MMOL/L — SIGNIFICANT CHANGE UP (ref 5–17)
APTT BLD: 54.1 SEC — HIGH (ref 24.5–35.6)
APTT BLD: 57.9 SEC — HIGH (ref 24.5–35.6)
APTT BLD: 58.4 SEC — HIGH (ref 24.5–35.6)
BLD GP AB SCN SERPL QL: SIGNIFICANT CHANGE UP
BUN SERPL-MCNC: 13.4 MG/DL — SIGNIFICANT CHANGE UP (ref 8–20)
CALCIUM SERPL-MCNC: 8.5 MG/DL — SIGNIFICANT CHANGE UP (ref 8.4–10.5)
CHLORIDE SERPL-SCNC: 100 MMOL/L — SIGNIFICANT CHANGE UP (ref 96–108)
CO2 SERPL-SCNC: 20 MMOL/L — LOW (ref 22–29)
CREAT SERPL-MCNC: 0.89 MG/DL — SIGNIFICANT CHANGE UP (ref 0.5–1.3)
EGFR: 92 ML/MIN/1.73M2 — SIGNIFICANT CHANGE UP
GLUCOSE SERPL-MCNC: 120 MG/DL — HIGH (ref 70–99)
HCT VFR BLD CALC: 21.7 % — LOW (ref 34.5–45)
HGB BLD-MCNC: 6.9 G/DL — CRITICAL LOW (ref 11.5–15.5)
IRON SATN MFR SERPL: 13 UG/DL — LOW (ref 37–145)
IRON SATN MFR SERPL: 8 % — LOW (ref 14–50)
MCHC RBC-ENTMCNC: 30.1 PG — SIGNIFICANT CHANGE UP (ref 27–34)
MCHC RBC-ENTMCNC: 31.8 GM/DL — LOW (ref 32–36)
MCV RBC AUTO: 94.8 FL — SIGNIFICANT CHANGE UP (ref 80–100)
OB PNL STL: NEGATIVE — SIGNIFICANT CHANGE UP
PLATELET # BLD AUTO: 381 K/UL — SIGNIFICANT CHANGE UP (ref 150–400)
POTASSIUM SERPL-MCNC: 4.8 MMOL/L — SIGNIFICANT CHANGE UP (ref 3.5–5.3)
POTASSIUM SERPL-SCNC: 4.8 MMOL/L — SIGNIFICANT CHANGE UP (ref 3.5–5.3)
RBC # BLD: 2.29 M/UL — LOW (ref 3.8–5.2)
RBC # FLD: 12.6 % — SIGNIFICANT CHANGE UP (ref 10.3–14.5)
SODIUM SERPL-SCNC: 132 MMOL/L — LOW (ref 135–145)
TIBC SERPL-MCNC: 169 UG/DL — LOW (ref 220–430)
TRANSFERRIN SERPL-MCNC: 118 MG/DL — LOW (ref 192–382)
WBC # BLD: 9.21 K/UL — SIGNIFICANT CHANGE UP (ref 3.8–10.5)
WBC # FLD AUTO: 9.21 K/UL — SIGNIFICANT CHANGE UP (ref 3.8–10.5)

## 2023-08-23 PROCEDURE — 99232 SBSQ HOSP IP/OBS MODERATE 35: CPT

## 2023-08-23 PROCEDURE — 99233 SBSQ HOSP IP/OBS HIGH 50: CPT

## 2023-08-23 PROCEDURE — 74176 CT ABD & PELVIS W/O CONTRAST: CPT | Mod: 26

## 2023-08-23 RX ORDER — FERROUS SULFATE 325(65) MG
325 TABLET ORAL DAILY
Refills: 0 | Status: DISCONTINUED | OUTPATIENT
Start: 2023-08-24 | End: 2023-08-24

## 2023-08-23 RX ORDER — HYDROXYCHLOROQUINE SULFATE 200 MG
200 TABLET ORAL DAILY
Refills: 0 | Status: DISCONTINUED | OUTPATIENT
Start: 2023-08-23 | End: 2023-08-24

## 2023-08-23 RX ORDER — HYDROXYCHLOROQUINE SULFATE 200 MG
1 TABLET ORAL
Refills: 0 | DISCHARGE

## 2023-08-23 RX ORDER — BELIMUMAB 200 MG/ML
200 SOLUTION SUBCUTANEOUS
Refills: 0 | DISCHARGE

## 2023-08-23 RX ADMIN — MORPHINE SULFATE 2 MILLIGRAM(S): 50 CAPSULE, EXTENDED RELEASE ORAL at 05:51

## 2023-08-23 RX ADMIN — HEPARIN SODIUM 1300 UNIT(S)/HR: 5000 INJECTION INTRAVENOUS; SUBCUTANEOUS at 03:12

## 2023-08-23 RX ADMIN — Medication 200 MILLIGRAM(S): at 17:35

## 2023-08-23 RX ADMIN — TRAMADOL HYDROCHLORIDE 50 MILLIGRAM(S): 50 TABLET ORAL at 03:18

## 2023-08-23 RX ADMIN — HEPARIN SODIUM 1300 UNIT(S)/HR: 5000 INJECTION INTRAVENOUS; SUBCUTANEOUS at 13:55

## 2023-08-23 RX ADMIN — HEPARIN SODIUM 1300 UNIT(S)/HR: 5000 INJECTION INTRAVENOUS; SUBCUTANEOUS at 09:49

## 2023-08-23 RX ADMIN — MORPHINE SULFATE 2 MILLIGRAM(S): 50 CAPSULE, EXTENDED RELEASE ORAL at 06:30

## 2023-08-23 RX ADMIN — TRAMADOL HYDROCHLORIDE 50 MILLIGRAM(S): 50 TABLET ORAL at 03:45

## 2023-08-23 RX ADMIN — Medication 3 MILLIGRAM(S): at 23:24

## 2023-08-23 RX ADMIN — HEPARIN SODIUM 1300 UNIT(S)/HR: 5000 INJECTION INTRAVENOUS; SUBCUTANEOUS at 07:42

## 2023-08-23 RX ADMIN — HEPARIN SODIUM 2000 UNIT(S): 5000 INJECTION INTRAVENOUS; SUBCUTANEOUS at 03:19

## 2023-08-23 RX ADMIN — Medication 5 MILLIGRAM(S): at 05:51

## 2023-08-23 RX ADMIN — HEPARIN SODIUM 1300 UNIT(S)/HR: 5000 INJECTION INTRAVENOUS; SUBCUTANEOUS at 23:47

## 2023-08-23 NOTE — PROGRESS NOTE ADULT - PROBLEM SELECTOR PLAN 2
s/p recent renal bx to r/o lupus nephritis  albumin 1.9  now with small hematoma  trend hgb   check us prior to starting long acting noac    No further cardio recommendations, will sign off

## 2023-08-23 NOTE — PROGRESS NOTE ADULT - NS ATTEND AMEND GEN_ALL_CORE FT
Pulmonary embolism: continue heparin for now. no s/s of right heart strain. patient has a hx of resting sinus tach  O2 sat all >95 on ra.  Lupus nephritis:  s/p recent renal bx to r/o lupus nephritis  albumin 1.9  now with small hematoma  trend hgb   check us prior to starting long acting noac  No further cardio recommendations, will sign off.

## 2023-08-23 NOTE — PROGRESS NOTE ADULT - SUBJECTIVE AND OBJECTIVE BOX
DEJA BAJWA  27y  Female      Patient is a 27y old  Female who presents with a chief complaint of Shortness of breath and chest pain (23 Aug 2023 08:42)      INTERVAL HPI/OVERNIGHT EVENTS:  Seen and examined. Comfortable  Reports chest pain with deep inspiration and after exertion      REVIEW OF SYSTEMS:  All other review of systems performed, at least 12 and were negative except for above    T(C): 36.7 (08-23-23 @ 10:30), Max: 36.9 (08-23-23 @ 04:58)  HR: 120 (08-23-23 @ 13:20) (108 - 161)  BP: 131/89 (08-23-23 @ 13:20) (108/65 - 131/89)  RR: 17 (08-23-23 @ 10:30) (15 - 18)  SpO2: 95% (08-23-23 @ 10:30) (95% - 99%)  Wt(kg): --Vital Signs Last 24 Hrs  T(C): 36.7 (23 Aug 2023 10:30), Max: 36.9 (23 Aug 2023 04:58)  T(F): 98.1 (23 Aug 2023 10:30), Max: 98.4 (23 Aug 2023 04:58)  HR: 120 (23 Aug 2023 13:20) (108 - 161)  BP: 131/89 (23 Aug 2023 13:20) (108/65 - 131/89)  BP(mean): --  RR: 17 (23 Aug 2023 10:30) (15 - 18)  SpO2: 95% (23 Aug 2023 10:30) (95% - 99%)    Parameters below as of 23 Aug 2023 10:30  Patient On (Oxygen Delivery Method): room air        PHYSICAL EXAM:  GENERAL: NAD, well-groomed, well-developed  HEAD:  Atraumatic, Normocephalic  EYES: EOMI, PERRLA, conjunctiva and sclera clear  ENMT: No tonsillar erythema, exudates, or enlargement; Moist mucous membranes, Good dentition, No lesions  NECK: Supple, No JVD, Normal thyroid  NERVOUS SYSTEM:  Alert & Oriented X3, Good concentration; Motor Strength 5/5 B/L upper and lower extremities; DTRs 2+ intact and symmetric  CHEST/LUNG: Clear to percussion bilaterally; No rales, rhonchi, wheezing, or rubs  HEART: Regular rate and rhythm; No murmurs, rubs, or gallops  ABDOMEN: Soft, Nontender, Nondistended; Bowel sounds present  EXTREMITIES:  2+ Peripheral Pulses, No clubbing, cyanosis, or edema  LYMPH: No lymphadenopathy noted  SKIN: No rashes or lesions    Consultant(s) Notes Reviewed:  [x ] YES  [ ] NO  Care Discussed with Consultants/Other Providers [ x] YES  [ ] NO    LABS:                        6.9    9.21  )-----------( 381      ( 23 Aug 2023 09:14 )             21.7     08-23    132<L>  |  100  |  13.4  ----------------------------<  120<H>  4.8   |  20.0<L>  |  0.89    Ca    8.5      23 Aug 2023 09:14    TPro  5.4<L>  /  Alb  1.8<L>  /  TBili  <0.2<L>  /  DBili  x   /  AST  12  /  ALT  <5  /  AlkPhos  61  08-22    PTT - ( 23 Aug 2023 09:14 )  PTT:58.4 sec  Urinalysis Basic - ( 23 Aug 2023 09:14 )    Color: x / Appearance: x / SG: x / pH: x  Gluc: 120 mg/dL / Ketone: x  / Bili: x / Urobili: x   Blood: x / Protein: x / Nitrite: x   Leuk Esterase: x / RBC: x / WBC x   Sq Epi: x / Non Sq Epi: x / Bacteria: x      CAPILLARY BLOOD GLUCOSE            Urinalysis Basic - ( 23 Aug 2023 09:14 )    Color: x / Appearance: x / SG: x / pH: x  Gluc: 120 mg/dL / Ketone: x  / Bili: x / Urobili: x   Blood: x / Protein: x / Nitrite: x   Leuk Esterase: x / RBC: x / WBC x   Sq Epi: x / Non Sq Epi: x / Bacteria: x        RADIOLOGY & ADDITIONAL TESTS:    Imaging Personally Reviewed:  [ ] YES  [ ] NO    HEALTH ISSUES - PROBLEM Dx:  Pulmonary embolism    Pulmonary thromboembolism    Lupus nephritis        
[facility] Division of Hospital Medicine     Chief Complaint:  Shortness of breath and chest pain     SUBJECTIVE / OVERNIGHT EVENTS:   Pt reports no chest pain but still gets SOB with ambulation     Patient denies abd pain, N/V, fever, chills, dysuria or any other complaints. All remainder ROS negative.     MEDICATIONS  (STANDING):   azithromycin  IVPB 500 milliGRAM(s) IV Intermittent every 24 hours  cefTRIAXone Injectable. 1000 milliGRAM(s) IV Push every 24 hours  heparin  Infusion. 1200 Unit(s)/Hr (12 mL/Hr) IV Continuous <Continuous>  predniSONE   Tablet 1 milliGRAM(s) Oral daily    MEDICATIONS  (PRN):   acetaminophen     Tablet .. 650 milliGRAM(s) Oral every 6 hours PRN Temp greater or equal to 38C (100.4F), Mild Pain (1 - 3)  aluminum hydroxide/magnesium hydroxide/simethicone Suspension 30 milliLiter(s) Oral every 4 hours PRN Dyspepsia  heparin   Injectable 4500 Unit(s) IV Push every 6 hours PRN For aPTT less than 40  heparin   Injectable 2000 Unit(s) IV Push every 6 hours PRN For aPTT between 40 - 57  melatonin 3 milliGRAM(s) Oral at bedtime PRN Insomnia  morphine  - Injectable 2 milliGRAM(s) IV Push four times a day PRN Severe Pain (7 - 10)  ondansetron Injectable 4 milliGRAM(s) IV Push every 8 hours PRN Nausea and/or Vomiting  traMADol 50 milliGRAM(s) Oral every 6 hours PRN Moderate Pain (4 - 6)        I&O's Summary       PHYSICAL EXAM:   Vital Signs Last 24 Hrs  T(C): 37 (22 Aug 2023 04:35), Max: 37.1 (21 Aug 2023 16:20)  T(F): 98.6 (22 Aug 2023 04:35), Max: 98.8 (21 Aug 2023 16:20)  HR: 112 (22 Aug 2023 04:35) (104 - 118)  BP: 100/64 (22 Aug 2023 04:35) (96/62 - 105/70)  BP(mean): --  RR: 18 (22 Aug 2023 04:35) (18 - 18)  SpO2: 100% (22 Aug 2023 04:35) (99% - 100%)    Parameters below as of 22 Aug 2023 04:35  Patient On (Oxygen Delivery Method): room air        CONSTITUTIONAL: NAD, sitting up in bed   RESPIRATORY: Normal respiratory effort; lungs are clear to auscultation bilaterally   CARDIOVASCULAR: Tachycardia, normal S1 and S2   ABDOMEN: Nontender to palpation, normoactive bowel sounds   MUSCULOSKELETAL:  No clubbing or cyanosis of digits   PSYCH: A+O to person, place, and time; affect appropriate   NEUROLOGY: No gross sensory or motor deficits       LABS:                         8.1    10.26 )-----------( 395      ( 22 Aug 2023 04:46 )             25.2     08-22    138  |  104  |  15.9  ----------------------------<  94  4.7   |  22.0  |  0.98    Ca    8.1<L>      22 Aug 2023 04:46  Mg     1.4     08-21    TPro  5.4<L>  /  Alb  1.8<L>  /  TBili  <0.2<L>  /  DBili  x   /  AST  12  /  ALT  <5  /  AlkPhos  61  08-22    PT/INR - ( 21 Aug 2023 14:15 )   PT: 10.6 sec;   INR: 0.95 ratio         PTT - ( 22 Aug 2023 11:30 )  PTT:60.6 sec  CARDIAC MARKERS ( 21 Aug 2023 21:30 )  x     / <0.01 ng/mL / x     / x     / x      CARDIAC MARKERS ( 21 Aug 2023 14:15 )  x     / <0.01 ng/mL / x     / x     / x          Urinalysis Basic - ( 22 Aug 2023 04:46 )    Color: x / Appearance: x / SG: x / pH: x  Gluc: 94 mg/dL / Ketone: x  / Bili: x / Urobili: x   Blood: x / Protein: x / Nitrite: x   Leuk Esterase: x / RBC: x / WBC x   Sq Epi: x / Non Sq Epi: x / Bacteria: x    
                                                         [facility]                                                         CARDIOLOGY AT [facility]                                                                  [address] [facility] [place] [address]                                                         Telephone: [phone]. Fax:[phone]                                                                             PROGRESS NOTE    Reason for follow up: Pulmonary embolism  Update: Feeling better o2 sat 95-99% on RA  HGB fairly stable      Review of symptoms:   Cardiac:  No chest pain. No dyspnea. No palpitations.  Respiratory: no cough. No dyspnea  Gastrointestinal: No diarrhea. No abdominal pain. No bleeding.   Neuro: No focal neuro complaints.      Vitals:  T(C): 36.7 (08-23-23 @ 08:39), Max: 36.9 (08-23-23 @ 04:58)  HR: 112 (08-23-23 @ 08:39) (108 - 112)  BP: 108/65 (08-23-23 @ 08:39) (108/65 - 115/70)  RR: 18 (08-23-23 @ 08:39) (15 - 18)  SpO2: 95% (08-23-23 @ 08:39) (95% - 99%)  Wt(kg): --  I&O's Summary    Weight (kg): 59.4 (08-21 @ 18:35)      PHYSICAL EXAM:  Appearance: Comfortable. No acute distress  HEENT:  Atraumatic. Normocephalic.  Normal oral mucosa  Neurologic: A & O x 3, no gross focal deficits.  Cardiovascular: RRR S1 S2, No murmur, no rubs/gallops. No JVD  Respiratory: Lungs clear to auscultation, unlabored   Gastrointestinal:  Soft, Non-tender, + BS  Lower Extremities: No edema  Psychiatry: Patient is calm. No agitation.   Skin: warm and dry.      CURRENT MEDICATIONS:  MEDICATIONS  (STANDING):  heparin  Infusion. 1200 Unit(s)/Hr (12 mL/Hr) IV Continuous <Continuous>  predniSONE   Tablet 5 milliGRAM(s) Oral daily      LABS:	 	  CARDIAC MARKERS ( 21 Aug 2023 21:30 )  x     / <0.01 ng/mL / x     / x     / x      p-BNP 21 Aug 2023 21:30: x    , CARDIAC MARKERS ( 21 Aug 2023 14:15 )  x     / <0.01 ng/mL / x     / x     / x      p-BNP 21 Aug 2023 14:15: x                              7.9    7.59  )-----------( 422      ( 22 Aug 2023 19:37 )             25.2     08-22    138  |  104  |  15.9  ----------------------------<  94  4.7   |  22.0  |  0.98    Ca    8.1<L>      22 Aug 2023 04:46  Mg     1.4     08-21    TPro  5.4<L>  /  Alb  1.8<L>  /  TBili  <0.2<L>  /  DBili  x   /  AST  12  /  ALT  <5  /  AlkPhos  61  08-22    TELEMETRY:  sinus to sinus tach    DIAGNOSTIC TESTING:  [ ] Echocardiogram: < from: TTE Echo Complete w/ Contrast w/ Doppler (08.21.23 @ 19:47) >  Summary:   1. Left ventricular ejection fraction, by visual estimation, is 60 to   65%.   2. Normal global left ventricular systolic function.   3. The left ventricular diastolic function could not be assessed in this   study.   4. Trivial pericardial effusion.   5. Trace mitral valve regurgitation.   6. Mild tricuspid regurgitation. The estimated RVSP is 28 mmHg.   7. Normal size right ventricle with normal systolic function. The RV   free wall systolic strain is -22% (normal).  	    < from: CT Angio Chest PE Protocol w/ IV Cont (08.21.23 @ 17:24) >  Pulmonary embolism within multiple proximal segmental left lower lobe   pulmonary arterial branches.    Lingular consolidation compatible with infarct.    Patchy subpleural opacities may represent atelectasis or consolidation.    Small bilateral pleural effusions.    Nonocclusive thrombus identified within the IVC.    No evidence of intra-abdominal abscess.    Small hyperdense collection posterior to the right kidney is compatible   with a small hematoma post biopsy.      < from: TTE Echo Complete w/ Contrast w/ Doppler (08.21.23 @ 19:47) >  Summary:   1. Left ventricular ejection fraction, by visual estimation, is 60 to   65%.   2. Normal global left ventricular systolic function.   3. The left ventricular diastolic function could not be assessed in this   study.   4. Trivial pericardial effusion.   5. Trace mitral valve regurgitation.   6. Mild tricuspid regurgitation. The estimated RVSP is 28 mmHg.   7. Normal size right ventricle with normal systolic function. The RV   free wall systolic strain is -22% (normal).    < end of copied text >

## 2023-08-23 NOTE — PROGRESS NOTE ADULT - REASON FOR ADMISSION
Shortness of breath and chest pain

## 2023-08-23 NOTE — PROGRESS NOTE ADULT - ASSESSMENT
28 yo F PMHx of SLE (diagnosed 1 year ago, on daily prednisone and Benlysta weekly), Nephrotic syndrome presented for SOB and chest pain x several days. Imaging showing a PE and patchy opacity in the left base. Admitted for treatment of PE.     Acute PE   - c/w heparin gtt   - tele monitoring, HR elevated but pt reports this has been present prior to this episode   - SpO2 monitoring, goal SPO2>/=92%  - ECHO reviewed, no RV strain   - LE US with no DVT   - Appreciate Cardio consult, recommend heparin gtt another 24 hrs and then repeat US to evaluate renal hematoma   - If renal hematoma stable, can transition to NOAC   - Will need coagulopathy workup outpatient     Renal hematoma post biopsy   - CT A/P: Small hyperdense collection posterior to the right kidney is compatible with a small hematoma post biopsy.  - Repeat us pending however CT a/P repeated in light of anemia- stable hematoma, no new RP bleed    #Anemia  Likely anemia of iron def  Pt endorses heavy periods as of late, reports hx of mild anemia  Hgb 6.9, will transfuse 2 units, check ferritin in am, iron panel noted  Start PO Iron in am  Check FOBT    CAP unlikely  - CT Chest reviewed and findings most likely due to PE and atelectasis   -Monitor off abx    SLE   - continue prednisone 5 mg daily   - on weekly Benlysta     DVT ppx: heparin gtt

## 2023-08-23 NOTE — PROGRESS NOTE ADULT - PROBLEM SELECTOR PLAN 1
continue heparin for now  no s/s of right heart strain  patient has a hx of resting sinus tach  o2 sat all >95 on ra

## 2023-08-23 NOTE — PROGRESS NOTE ADULT - ASSESSMENT
28 y/o F with PMH Lupus/Sjogrens, on oral contraceptives, sinus tachycardia (takes metoprolol baseline, HR usually 120s), Family hx of blood clots who presents to [facility] ED with SOB x 2-3 days with chest pain and severe LE edema starting about 1 month ago. Found with PE,  multiple proximal segmental left lower lobe pulmonary arterial branches.Lingular consolidation compatible with infarct. & Nonocclusive thrombus identified within the IVC venous dopplers negative bnp 919 trop negative echo EF 65% no heart strain

## 2023-08-24 ENCOUNTER — TRANSCRIPTION ENCOUNTER (OUTPATIENT)
Age: 28
End: 2023-08-24

## 2023-08-24 VITALS
RESPIRATION RATE: 17 BRPM | TEMPERATURE: 98 F | DIASTOLIC BLOOD PRESSURE: 86 MMHG | SYSTOLIC BLOOD PRESSURE: 122 MMHG | HEART RATE: 98 BPM | OXYGEN SATURATION: 99 %

## 2023-08-24 LAB
APTT BLD: 57.1 SEC — HIGH (ref 24.5–35.6)
APTT BLD: 71.2 SEC — HIGH (ref 24.5–35.6)
FERRITIN SERPL-MCNC: 89 NG/ML — SIGNIFICANT CHANGE UP (ref 15–150)
HCT VFR BLD CALC: 31.3 % — LOW (ref 34.5–45)
HCT VFR BLD CALC: 33.2 % — LOW (ref 34.5–45)
HGB BLD-MCNC: 10.3 G/DL — LOW (ref 11.5–15.5)
HGB BLD-MCNC: 11 G/DL — LOW (ref 11.5–15.5)
MCHC RBC-ENTMCNC: 29.9 PG — SIGNIFICANT CHANGE UP (ref 27–34)
MCHC RBC-ENTMCNC: 32.9 GM/DL — SIGNIFICANT CHANGE UP (ref 32–36)
MCV RBC AUTO: 90.7 FL — SIGNIFICANT CHANGE UP (ref 80–100)
PLATELET # BLD AUTO: 374 K/UL — SIGNIFICANT CHANGE UP (ref 150–400)
RBC # BLD: 3.45 M/UL — LOW (ref 3.8–5.2)
RBC # FLD: 12.6 % — SIGNIFICANT CHANGE UP (ref 10.3–14.5)
WBC # BLD: 6.8 K/UL — SIGNIFICANT CHANGE UP (ref 3.8–10.5)
WBC # FLD AUTO: 6.8 K/UL — SIGNIFICANT CHANGE UP (ref 3.8–10.5)

## 2023-08-24 PROCEDURE — 85014 HEMATOCRIT: CPT

## 2023-08-24 PROCEDURE — 71045 X-RAY EXAM CHEST 1 VIEW: CPT

## 2023-08-24 PROCEDURE — 83690 ASSAY OF LIPASE: CPT

## 2023-08-24 PROCEDURE — 36415 COLL VENOUS BLD VENIPUNCTURE: CPT

## 2023-08-24 PROCEDURE — 86850 RBC ANTIBODY SCREEN: CPT

## 2023-08-24 PROCEDURE — 74177 CT ABD & PELVIS W/CONTRAST: CPT | Mod: MG

## 2023-08-24 PROCEDURE — 84702 CHORIONIC GONADOTROPIN TEST: CPT

## 2023-08-24 PROCEDURE — P9016: CPT

## 2023-08-24 PROCEDURE — 82272 OCCULT BLD FECES 1-3 TESTS: CPT

## 2023-08-24 PROCEDURE — 85730 THROMBOPLASTIN TIME PARTIAL: CPT

## 2023-08-24 PROCEDURE — 80053 COMPREHEN METABOLIC PANEL: CPT

## 2023-08-24 PROCEDURE — 83550 IRON BINDING TEST: CPT

## 2023-08-24 PROCEDURE — 93005 ELECTROCARDIOGRAM TRACING: CPT

## 2023-08-24 PROCEDURE — 99239 HOSP IP/OBS DSCHRG MGMT >30: CPT

## 2023-08-24 PROCEDURE — 85027 COMPLETE CBC AUTOMATED: CPT

## 2023-08-24 PROCEDURE — 84466 ASSAY OF TRANSFERRIN: CPT

## 2023-08-24 PROCEDURE — 83880 ASSAY OF NATRIURETIC PEPTIDE: CPT

## 2023-08-24 PROCEDURE — 83735 ASSAY OF MAGNESIUM: CPT

## 2023-08-24 PROCEDURE — 71275 CT ANGIOGRAPHY CHEST: CPT | Mod: MA

## 2023-08-24 PROCEDURE — 36430 TRANSFUSION BLD/BLD COMPNT: CPT

## 2023-08-24 PROCEDURE — 85610 PROTHROMBIN TIME: CPT

## 2023-08-24 PROCEDURE — 80048 BASIC METABOLIC PNL TOTAL CA: CPT

## 2023-08-24 PROCEDURE — 74176 CT ABD & PELVIS W/O CONTRAST: CPT

## 2023-08-24 PROCEDURE — 86900 BLOOD TYPING SEROLOGIC ABO: CPT

## 2023-08-24 PROCEDURE — 99291 CRITICAL CARE FIRST HOUR: CPT

## 2023-08-24 PROCEDURE — C8929: CPT

## 2023-08-24 PROCEDURE — 86923 COMPATIBILITY TEST ELECTRIC: CPT

## 2023-08-24 PROCEDURE — 85018 HEMOGLOBIN: CPT

## 2023-08-24 PROCEDURE — 86901 BLOOD TYPING SEROLOGIC RH(D): CPT

## 2023-08-24 PROCEDURE — G1004: CPT

## 2023-08-24 PROCEDURE — 85025 COMPLETE CBC W/AUTO DIFF WBC: CPT

## 2023-08-24 PROCEDURE — 84484 ASSAY OF TROPONIN QUANT: CPT

## 2023-08-24 PROCEDURE — 82728 ASSAY OF FERRITIN: CPT

## 2023-08-24 PROCEDURE — 93970 EXTREMITY STUDY: CPT

## 2023-08-24 PROCEDURE — 83540 ASSAY OF IRON: CPT

## 2023-08-24 RX ORDER — FERROUS SULFATE 325(65) MG
1 TABLET ORAL
Qty: 30 | Refills: 0
Start: 2023-08-24 | End: 2023-09-22

## 2023-08-24 RX ORDER — APIXABAN 2.5 MG/1
2 TABLET, FILM COATED ORAL
Qty: 52 | Refills: 0
Start: 2023-08-24 | End: 2023-09-22

## 2023-08-24 RX ORDER — DROSPIRENONE AND ETHINYL ESTRADIOL 0.03MG-3MG
1 KIT ORAL
Refills: 0 | DISCHARGE

## 2023-08-24 RX ORDER — APIXABAN 2.5 MG/1
10 TABLET, FILM COATED ORAL EVERY 12 HOURS
Refills: 0 | Status: DISCONTINUED | OUTPATIENT
Start: 2023-08-24 | End: 2023-08-24

## 2023-08-24 RX ORDER — IRON SUCROSE 20 MG/ML
200 INJECTION, SOLUTION INTRAVENOUS ONCE
Refills: 0 | Status: COMPLETED | OUTPATIENT
Start: 2023-08-24 | End: 2023-08-24

## 2023-08-24 RX ADMIN — TRAMADOL HYDROCHLORIDE 50 MILLIGRAM(S): 50 TABLET ORAL at 03:00

## 2023-08-24 RX ADMIN — APIXABAN 10 MILLIGRAM(S): 2.5 TABLET, FILM COATED ORAL at 16:39

## 2023-08-24 RX ADMIN — HEPARIN SODIUM 1400 UNIT(S)/HR: 5000 INJECTION INTRAVENOUS; SUBCUTANEOUS at 07:28

## 2023-08-24 RX ADMIN — Medication 5 MILLIGRAM(S): at 06:00

## 2023-08-24 RX ADMIN — Medication 325 MILLIGRAM(S): at 11:18

## 2023-08-24 RX ADMIN — Medication 200 MILLIGRAM(S): at 11:18

## 2023-08-24 RX ADMIN — Medication 650 MILLIGRAM(S): at 06:34

## 2023-08-24 RX ADMIN — HEPARIN SODIUM 1400 UNIT(S)/HR: 5000 INJECTION INTRAVENOUS; SUBCUTANEOUS at 08:49

## 2023-08-24 RX ADMIN — TRAMADOL HYDROCHLORIDE 50 MILLIGRAM(S): 50 TABLET ORAL at 02:17

## 2023-08-24 RX ADMIN — HEPARIN SODIUM 1400 UNIT(S)/HR: 5000 INJECTION INTRAVENOUS; SUBCUTANEOUS at 15:24

## 2023-08-24 RX ADMIN — IRON SUCROSE 200 MILLIGRAM(S): 20 INJECTION, SOLUTION INTRAVENOUS at 16:40

## 2023-08-24 NOTE — DISCHARGE NOTE PROVIDER - HOSPITAL COURSE
28 yo F with SLE, nephrotic syndrome admitted with acute PE. Started on heparin ggt. Imaging consistent with a small RP hematoma which has been stable on f/u imaging. Pt wishes to leave and will go home on AC with strict instructions to f/u with her Hematologist and also with renal within 1 week. Will need hypercoagulable w/u with Heme as an outpt (d/w [place] bld and Ca via telephone who she sees for anemia)

## 2023-08-24 NOTE — DISCHARGE NOTE PROVIDER - NSDCMRMEDTOKEN_GEN_ALL_CORE_FT
Benlysta 200 mg/mL subcutaneous solution: 200 subcutaneously once a week  ferrous sulfate 325 mg (65 mg elemental iron) oral tablet: 1 tab(s) orally once a day  hydroxychloroquine 200 mg oral tablet: 1 orally once a day  predniSONE 5 mg oral tablet: 1 orally once a day   apixaban 5 mg oral tablet: 2 tab(s) orally every 12 hours Take 2 tablets twice daily for 13 doses and then 1 tablet twice daily thereafter  Benlysta 200 mg/mL subcutaneous solution: 200 subcutaneously once a week  ferrous sulfate 325 mg (65 mg elemental iron) oral tablet: 1 tab(s) orally once a day  hydroxychloroquine 200 mg oral tablet: 1 orally once a day  predniSONE 5 mg oral tablet: 1 orally once a day

## 2023-08-24 NOTE — DISCHARGE NOTE PROVIDER - CARE PROVIDER_API CALL
Edin Ley  Hematology/Oncology  [address] [place] [address]  Phone: [phone]  Fax: [phone]  Follow Up Time: 1 week

## 2023-08-24 NOTE — DISCHARGE NOTE NURSING/CASE MANAGEMENT/SOCIAL WORK - PATIENT PORTAL LINK FT
Jazlyn Kumar (:  1969) is a 49 y.o. female,Established patient, here for evaluation of the following chief complaint(s):  Follow-up        HPI   49 y.o. female with hx of RA on orencia , chronic anxiety , panic attacks , HTN here for regular f.w      seropositive RA since  and has been on embrel and Imuran for a long time with good control. Has been weaned off from [address] since  and stayed on imuran , last year  seen Rheum , Dr. Jay Maya and started on orencia and did  better until recently and now her arthritis getting worse. Did not tolerate CIMZIA and now on orencia , remains off IMURAN    Denies any side effects from [place]  and reports her arthritis and pain is improving. No recent steroids and no recent flareups    she moved from [place] to [facility] to take care of his elderly parents and to help her  get renal transplant. her  developed renal complications and passed away now . Has parents close by. 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 Does not smoke     She suffered with severe anxiety and panic attacks while she was an alcoholic >50 yrs ago , used to cut her arms frequently . Has been off all meds and now maintained well on lexapro only  Recently her LFT are touch elevated in  and she got alarmed and stopped statins, mobic and does not want any meds that effect liver     Headaches improved and off meds now     Hyperactive and boisterous voice noted    Denies any smoking .  No illicit drug use but uses Hemp oil for her hands to help arthritis  No previous pulm or cardiac issues    HTN-  Recently started on amlodipine and doing well   Occasional high readings  Given losartan but pt did not start      Allergies   Allergen Reactions    Tizanidine Hives    Etanercept Other (See Comments)     Sinus infections    Methotrexate Other (See Comments)    Sulfa Antibiotics     Azithromycin Diarrhea
You can access the FollowMyHealth Patient Portal offered by [facility] by registering at the following website: [email or web address]. By joining [employer]’s FollowMyHealth portal, you will also be able to view your health information using other applications (apps) compatible with our system.

## 2023-08-24 NOTE — DISCHARGE NOTE PROVIDER - ATTENDING DISCHARGE PHYSICAL EXAMINATION:
Gen : Non toxic appearing, comfortable, NAD  HEENT : NCAT, MMM, No cervical lymphadenopathy, no JVD  CVS : S1S2, tachycardic,  regular rhythm, no murmurs  Resp : CTA b/l, no rhonchi or wheezes appreciated   Abd : Soft, non distended, non tender, BS +ve   MSK : No joint swelling or tenderness, no digital clubbing, no distal cyanosis  Neuro : CN II-XII intact, no focal motor or sensory deficits   Psych : Pleasant, no anxiety, normal affect

## 2023-08-24 NOTE — CHART NOTE - NSCHARTNOTEFT_GEN_A_CORE
Ms Lilliana Tamayo was admitted to [facility] to 8/21/23 and is planned for dsc 8/24/23. She is anticipated to be able to return to work 9/1/23

## 2023-08-24 NOTE — DISCHARGE NOTE PROVIDER - NSDCFUSCHEDAPPT_GEN_ALL_CORE_FT
Kayla Hernandez  [facility]  RHEUM [address]  Scheduled Appointment: 09/05/2023    Ayden George  [facility]  NEPHRO [address]  Scheduled Appointment: 09/13/2023

## 2023-09-01 ENCOUNTER — LABORATORY RESULT (OUTPATIENT)
Age: 28
End: 2023-09-01

## 2023-09-05 ENCOUNTER — APPOINTMENT (OUTPATIENT)
Dept: RHEUMATOLOGY | Facility: CLINIC | Age: 28
End: 2023-09-05
Payer: MEDICAID

## 2023-09-05 VITALS
RESPIRATION RATE: 17 BRPM | DIASTOLIC BLOOD PRESSURE: 70 MMHG | OXYGEN SATURATION: 99 % | HEIGHT: 64 IN | TEMPERATURE: 98 F | HEART RATE: 129 BPM | SYSTOLIC BLOOD PRESSURE: 110 MMHG

## 2023-09-05 LAB
G6PD SER-CCNC: 16.5 U/G HGB
TPMT ENZYME INTERPRETATION: NORMAL
TPMT ENZYME METHODOLOGY: NORMAL
TPMT ENZYME: 17.1

## 2023-09-05 PROCEDURE — 99215 OFFICE O/P EST HI 40 MIN: CPT

## 2023-09-05 RX ORDER — SIMVASTATIN 10 MG/1
10 TABLET, FILM COATED ORAL DAILY
Refills: 0 | Status: DISCONTINUED | COMMUNITY
End: 2023-09-05

## 2023-09-05 RX ORDER — DROSPIRENONE AND ETHINYL ESTRADIOL 0.02-3(28)
3-0.02 KIT ORAL DAILY
Qty: 1 | Refills: 2 | Status: DISCONTINUED | COMMUNITY
Start: 2023-06-21 | End: 2023-09-05

## 2023-09-05 NOTE — HISTORY OF PRESENT ILLNESS
[FreeTextEntry1] : Pt presenting today for a f.u visit for SLE/ sjogrens. Last seen 08/2023. Chart reviewed since LV. Currently on SC Benlysta, HCQ 200mg daily, prednisone 5mg daily. Tolerating medication well. Denies side effects. Recently started of MMF 500mg BID for class V membranous lupus nephritis.  Also recently dx with multiple PE's on AC now.  Feels much better overall today.

## 2023-09-05 NOTE — ASSESSMENT
[FreeTextEntry1] : SLE/ Sjogrens- Labs with significantly elevated JADE, dsdna, hypocomplementemia, elevated inflammatory markers, leukopenia, proteinuria, SSA/SSB, RNP polymerase III  Now s/p renal biopsy with class V membranous lupus nephritis.   - repeat labs prior to follow up - c/w SC Benlysta - c/w HCQ 300mg daily based on her weight - taper prednisone to 2mg daily - ophthalmology evaluation  - will avoid MTX in child bearing age female - c/w MMF 500mg BID. Increase to 1000mg BID in 1 week - reviewed risk and benefits of medications - encouraged compliance with medications and follow up visits  Discussed treatment plan with the patient. The patient was given the opportunity to ask questions and all questions were answered to their satisfaction.

## 2023-09-24 ENCOUNTER — RX RENEWAL (OUTPATIENT)
Age: 28
End: 2023-09-24

## 2023-10-16 ENCOUNTER — RX RENEWAL (OUTPATIENT)
Age: 28
End: 2023-10-16

## 2023-10-25 ENCOUNTER — APPOINTMENT (OUTPATIENT)
Dept: NEPHROLOGY | Facility: CLINIC | Age: 28
End: 2023-10-25
Payer: MEDICAID

## 2023-10-25 VITALS
DIASTOLIC BLOOD PRESSURE: 68 MMHG | WEIGHT: 126 LBS | BODY MASS INDEX: 21.51 KG/M2 | HEIGHT: 64 IN | HEART RATE: 92 BPM | OXYGEN SATURATION: 98 % | SYSTOLIC BLOOD PRESSURE: 106 MMHG

## 2023-10-25 PROCEDURE — 99214 OFFICE O/P EST MOD 30 MIN: CPT

## 2023-10-25 RX ORDER — FUROSEMIDE 40 MG/1
40 TABLET ORAL DAILY
Qty: 30 | Refills: 0 | Status: DISCONTINUED | COMMUNITY
Start: 2023-07-20 | End: 2023-10-25

## 2023-11-03 LAB
ALBUMIN SERPL ELPH-MCNC: 3 G/DL
ANION GAP SERPL CALC-SCNC: 13 MMOL/L
APPEARANCE: CLEAR
BACTERIA: ABNORMAL /HPF
BILIRUBIN URINE: NEGATIVE
BLOOD URINE: ABNORMAL
BUN SERPL-MCNC: 14 MG/DL
CALCIUM SERPL-MCNC: 9 MG/DL
CAST: 3 /LPF
CHLORIDE SERPL-SCNC: 105 MMOL/L
CO2 SERPL-SCNC: 20 MMOL/L
COLOR: YELLOW
CREAT SERPL-MCNC: 0.69 MG/DL
CREAT SPEC-SCNC: 173 MG/DL
CREAT/PROT UR: 4.3 RATIO
EGFR: 122 ML/MIN/1.73M2
EPITHELIAL CELLS: 8 /HPF
GLUCOSE QUALITATIVE U: NEGATIVE MG/DL
GLUCOSE SERPL-MCNC: 85 MG/DL
KETONES URINE: NEGATIVE MG/DL
LEUKOCYTE ESTERASE URINE: NEGATIVE
MICROSCOPIC-UA: NORMAL
NITRITE URINE: NEGATIVE
PH URINE: 6
PHOSPHATE SERPL-MCNC: 4.7 MG/DL
POTASSIUM SERPL-SCNC: 4.4 MMOL/L
PROT UR-MCNC: 737 MG/DL
PROTEIN URINE: 300 MG/DL
RED BLOOD CELLS URINE: 4 /HPF
REVIEW: NORMAL
SODIUM SERPL-SCNC: 138 MMOL/L
SPECIFIC GRAVITY URINE: 1.03
UROBILINOGEN URINE: 0.2 MG/DL
WHITE BLOOD CELLS URINE: 13 /HPF

## 2023-11-08 ENCOUNTER — APPOINTMENT (OUTPATIENT)
Dept: RHEUMATOLOGY | Facility: CLINIC | Age: 28
End: 2023-11-08
Payer: COMMERCIAL

## 2023-11-08 VITALS
BODY MASS INDEX: 21.51 KG/M2 | RESPIRATION RATE: 17 BRPM | DIASTOLIC BLOOD PRESSURE: 70 MMHG | WEIGHT: 126 LBS | SYSTOLIC BLOOD PRESSURE: 110 MMHG | TEMPERATURE: 97.4 F | HEIGHT: 64 IN

## 2023-11-08 LAB
ALBUMIN SERPL ELPH-MCNC: 3.2 G/DL
ALP BLD-CCNC: 44 U/L
ALT SERPL-CCNC: 10 U/L
ANION GAP SERPL CALC-SCNC: 11 MMOL/L
AST SERPL-CCNC: 21 U/L
BASOPHILS # BLD AUTO: 0.02 K/UL
BASOPHILS NFR BLD AUTO: 0.6 %
BILIRUB SERPL-MCNC: 0.2 MG/DL
BUN SERPL-MCNC: 13 MG/DL
C3 SERPL-MCNC: 115 MG/DL
C4 SERPL-MCNC: 37 MG/DL
CALCIUM SERPL-MCNC: 8.8 MG/DL
CHLORIDE SERPL-SCNC: 108 MMOL/L
CK SERPL-CCNC: 48 U/L
CO2 SERPL-SCNC: 22 MMOL/L
CREAT SERPL-MCNC: 0.68 MG/DL
CRP SERPL-MCNC: <3 MG/L
DSDNA AB SER-ACNC: 23 IU/ML
EGFR: 122 ML/MIN/1.73M2
EOSINOPHIL # BLD AUTO: 0.05 K/UL
EOSINOPHIL NFR BLD AUTO: 1.4 %
GLUCOSE SERPL-MCNC: 88 MG/DL
HCT VFR BLD CALC: 31.9 %
HGB BLD-MCNC: 9.5 G/DL
IMM GRANULOCYTES NFR BLD AUTO: 0.6 %
LYMPHOCYTES # BLD AUTO: 0.86 K/UL
LYMPHOCYTES NFR BLD AUTO: 24.2 %
MAN DIFF?: NORMAL
MCHC RBC-ENTMCNC: 29.2 PG
MCHC RBC-ENTMCNC: 29.8 GM/DL
MCV RBC AUTO: 98.2 FL
MONOCYTES # BLD AUTO: 0.31 K/UL
MONOCYTES NFR BLD AUTO: 8.7 %
NEUTROPHILS # BLD AUTO: 2.29 K/UL
NEUTROPHILS NFR BLD AUTO: 64.5 %
PLATELET # BLD AUTO: 335 K/UL
POTASSIUM SERPL-SCNC: 4.8 MMOL/L
PROT SERPL-MCNC: 5.4 G/DL
RBC # BLD: 3.25 M/UL
RBC # FLD: 15.1 %
SODIUM SERPL-SCNC: 141 MMOL/L
WBC # FLD AUTO: 3.55 K/UL

## 2023-11-08 PROCEDURE — 99215 OFFICE O/P EST HI 40 MIN: CPT

## 2023-11-08 RX ORDER — PREDNISONE 1 MG/1
1 TABLET ORAL DAILY
Qty: 60 | Refills: 0 | Status: DISCONTINUED | COMMUNITY
Start: 2023-09-05 | End: 2023-11-08

## 2023-11-08 RX ORDER — PREDNISONE 10 MG/1
10 TABLET ORAL DAILY
Qty: 30 | Refills: 0 | Status: DISCONTINUED | COMMUNITY
Start: 2023-05-22 | End: 2023-11-08

## 2023-11-17 ENCOUNTER — EMERGENCY (EMERGENCY)
Facility: HOSPITAL | Age: 28
LOS: 1 days | Discharge: HOME | End: 2023-11-17
Attending: EMERGENCY MEDICINE
Payer: COMMERCIAL

## 2023-11-17 VITALS
HEART RATE: 88 BPM | RESPIRATION RATE: 18 BRPM | TEMPERATURE: 98 F | OXYGEN SATURATION: 100 % | DIASTOLIC BLOOD PRESSURE: 58 MMHG | SYSTOLIC BLOOD PRESSURE: 106 MMHG

## 2023-11-17 VITALS
SYSTOLIC BLOOD PRESSURE: 111 MMHG | TEMPERATURE: 98 F | RESPIRATION RATE: 16 BRPM | HEIGHT: 64 IN | DIASTOLIC BLOOD PRESSURE: 61 MMHG | OXYGEN SATURATION: 99 % | WEIGHT: 126.1 LBS | HEART RATE: 104 BPM

## 2023-11-17 DIAGNOSIS — N63.10 UNSPECIFIED LUMP IN THE RIGHT BREAST, UNSPECIFIED QUADRANT: Chronic | ICD-10-CM

## 2023-11-17 LAB
ALBUMIN SERPL ELPH-MCNC: 3.3 G/DL — SIGNIFICANT CHANGE UP (ref 3.3–5.2)
ALBUMIN SERPL ELPH-MCNC: 3.3 G/DL — SIGNIFICANT CHANGE UP (ref 3.3–5.2)
ALP SERPL-CCNC: 44 U/L — SIGNIFICANT CHANGE UP (ref 40–120)
ALP SERPL-CCNC: 44 U/L — SIGNIFICANT CHANGE UP (ref 40–120)
ALT FLD-CCNC: 8 U/L — SIGNIFICANT CHANGE UP
ALT FLD-CCNC: 8 U/L — SIGNIFICANT CHANGE UP
ANION GAP SERPL CALC-SCNC: 12 MMOL/L — SIGNIFICANT CHANGE UP (ref 5–17)
ANION GAP SERPL CALC-SCNC: 12 MMOL/L — SIGNIFICANT CHANGE UP (ref 5–17)
APTT BLD: 34.1 SEC — SIGNIFICANT CHANGE UP (ref 24.5–35.6)
APTT BLD: 34.1 SEC — SIGNIFICANT CHANGE UP (ref 24.5–35.6)
AST SERPL-CCNC: 20 U/L — SIGNIFICANT CHANGE UP
AST SERPL-CCNC: 20 U/L — SIGNIFICANT CHANGE UP
BASOPHILS # BLD AUTO: 0.05 K/UL — SIGNIFICANT CHANGE UP (ref 0–0.2)
BASOPHILS # BLD AUTO: 0.05 K/UL — SIGNIFICANT CHANGE UP (ref 0–0.2)
BASOPHILS NFR BLD AUTO: 1.7 % — SIGNIFICANT CHANGE UP (ref 0–2)
BASOPHILS NFR BLD AUTO: 1.7 % — SIGNIFICANT CHANGE UP (ref 0–2)
BILIRUB SERPL-MCNC: 0.2 MG/DL — LOW (ref 0.4–2)
BILIRUB SERPL-MCNC: 0.2 MG/DL — LOW (ref 0.4–2)
BUN SERPL-MCNC: 21.1 MG/DL — HIGH (ref 8–20)
BUN SERPL-MCNC: 21.1 MG/DL — HIGH (ref 8–20)
CALCIUM SERPL-MCNC: 8.5 MG/DL — SIGNIFICANT CHANGE UP (ref 8.4–10.5)
CALCIUM SERPL-MCNC: 8.5 MG/DL — SIGNIFICANT CHANGE UP (ref 8.4–10.5)
CHLORIDE SERPL-SCNC: 104 MMOL/L — SIGNIFICANT CHANGE UP (ref 96–108)
CHLORIDE SERPL-SCNC: 104 MMOL/L — SIGNIFICANT CHANGE UP (ref 96–108)
CO2 SERPL-SCNC: 22 MMOL/L — SIGNIFICANT CHANGE UP (ref 22–29)
CO2 SERPL-SCNC: 22 MMOL/L — SIGNIFICANT CHANGE UP (ref 22–29)
CREAT SERPL-MCNC: 0.77 MG/DL — SIGNIFICANT CHANGE UP (ref 0.5–1.3)
CREAT SERPL-MCNC: 0.77 MG/DL — SIGNIFICANT CHANGE UP (ref 0.5–1.3)
EGFR: 108 ML/MIN/1.73M2 — SIGNIFICANT CHANGE UP
EGFR: 108 ML/MIN/1.73M2 — SIGNIFICANT CHANGE UP
EOSINOPHIL # BLD AUTO: 0.03 K/UL — SIGNIFICANT CHANGE UP (ref 0–0.5)
EOSINOPHIL # BLD AUTO: 0.03 K/UL — SIGNIFICANT CHANGE UP (ref 0–0.5)
EOSINOPHIL NFR BLD AUTO: 0.9 % — SIGNIFICANT CHANGE UP (ref 0–6)
EOSINOPHIL NFR BLD AUTO: 0.9 % — SIGNIFICANT CHANGE UP (ref 0–6)
GIANT PLATELETS BLD QL SMEAR: PRESENT — SIGNIFICANT CHANGE UP
GIANT PLATELETS BLD QL SMEAR: PRESENT — SIGNIFICANT CHANGE UP
GLUCOSE SERPL-MCNC: 78 MG/DL — SIGNIFICANT CHANGE UP (ref 70–99)
GLUCOSE SERPL-MCNC: 78 MG/DL — SIGNIFICANT CHANGE UP (ref 70–99)
HCG SERPL-ACNC: <4 MIU/ML — SIGNIFICANT CHANGE UP
HCG SERPL-ACNC: <4 MIU/ML — SIGNIFICANT CHANGE UP
HCT VFR BLD CALC: 27.6 % — LOW (ref 34.5–45)
HCT VFR BLD CALC: 27.6 % — LOW (ref 34.5–45)
HGB BLD-MCNC: 9.2 G/DL — LOW (ref 11.5–15.5)
HGB BLD-MCNC: 9.2 G/DL — LOW (ref 11.5–15.5)
INR BLD: 1.04 RATIO — SIGNIFICANT CHANGE UP (ref 0.85–1.18)
INR BLD: 1.04 RATIO — SIGNIFICANT CHANGE UP (ref 0.85–1.18)
LYMPHOCYTES # BLD AUTO: 0.76 K/UL — LOW (ref 1–3.3)
LYMPHOCYTES # BLD AUTO: 0.76 K/UL — LOW (ref 1–3.3)
LYMPHOCYTES # BLD AUTO: 23.7 % — SIGNIFICANT CHANGE UP (ref 13–44)
LYMPHOCYTES # BLD AUTO: 23.7 % — SIGNIFICANT CHANGE UP (ref 13–44)
MANUAL SMEAR VERIFICATION: SIGNIFICANT CHANGE UP
MANUAL SMEAR VERIFICATION: SIGNIFICANT CHANGE UP
MCHC RBC-ENTMCNC: 29.4 PG — SIGNIFICANT CHANGE UP (ref 27–34)
MCHC RBC-ENTMCNC: 29.4 PG — SIGNIFICANT CHANGE UP (ref 27–34)
MCHC RBC-ENTMCNC: 33.3 GM/DL — SIGNIFICANT CHANGE UP (ref 32–36)
MCHC RBC-ENTMCNC: 33.3 GM/DL — SIGNIFICANT CHANGE UP (ref 32–36)
MCV RBC AUTO: 88.2 FL — SIGNIFICANT CHANGE UP (ref 80–100)
MCV RBC AUTO: 88.2 FL — SIGNIFICANT CHANGE UP (ref 80–100)
MONOCYTES # BLD AUTO: 0.22 K/UL — SIGNIFICANT CHANGE UP (ref 0–0.9)
MONOCYTES # BLD AUTO: 0.22 K/UL — SIGNIFICANT CHANGE UP (ref 0–0.9)
MONOCYTES NFR BLD AUTO: 7 % — SIGNIFICANT CHANGE UP (ref 2–14)
MONOCYTES NFR BLD AUTO: 7 % — SIGNIFICANT CHANGE UP (ref 2–14)
MYELOCYTES NFR BLD: 0.9 % — HIGH (ref 0–0)
MYELOCYTES NFR BLD: 0.9 % — HIGH (ref 0–0)
NEUTROPHILS # BLD AUTO: 2.11 K/UL — SIGNIFICANT CHANGE UP (ref 1.8–7.4)
NEUTROPHILS # BLD AUTO: 2.11 K/UL — SIGNIFICANT CHANGE UP (ref 1.8–7.4)
NEUTROPHILS NFR BLD AUTO: 65.8 % — SIGNIFICANT CHANGE UP (ref 43–77)
NEUTROPHILS NFR BLD AUTO: 65.8 % — SIGNIFICANT CHANGE UP (ref 43–77)
NT-PROBNP SERPL-SCNC: 71 PG/ML — SIGNIFICANT CHANGE UP (ref 0–300)
NT-PROBNP SERPL-SCNC: 71 PG/ML — SIGNIFICANT CHANGE UP (ref 0–300)
OVALOCYTES BLD QL SMEAR: SLIGHT — SIGNIFICANT CHANGE UP
OVALOCYTES BLD QL SMEAR: SLIGHT — SIGNIFICANT CHANGE UP
PLAT MORPH BLD: NORMAL — SIGNIFICANT CHANGE UP
PLAT MORPH BLD: NORMAL — SIGNIFICANT CHANGE UP
PLATELET # BLD AUTO: 316 K/UL — SIGNIFICANT CHANGE UP (ref 150–400)
PLATELET # BLD AUTO: 316 K/UL — SIGNIFICANT CHANGE UP (ref 150–400)
POIKILOCYTOSIS BLD QL AUTO: SLIGHT — SIGNIFICANT CHANGE UP
POIKILOCYTOSIS BLD QL AUTO: SLIGHT — SIGNIFICANT CHANGE UP
POTASSIUM SERPL-MCNC: 4.2 MMOL/L — SIGNIFICANT CHANGE UP (ref 3.5–5.3)
POTASSIUM SERPL-MCNC: 4.2 MMOL/L — SIGNIFICANT CHANGE UP (ref 3.5–5.3)
POTASSIUM SERPL-SCNC: 4.2 MMOL/L — SIGNIFICANT CHANGE UP (ref 3.5–5.3)
POTASSIUM SERPL-SCNC: 4.2 MMOL/L — SIGNIFICANT CHANGE UP (ref 3.5–5.3)
PROT SERPL-MCNC: 5.7 G/DL — LOW (ref 6.6–8.7)
PROT SERPL-MCNC: 5.7 G/DL — LOW (ref 6.6–8.7)
PROTHROM AB SERPL-ACNC: 11.5 SEC — SIGNIFICANT CHANGE UP (ref 9.5–13)
PROTHROM AB SERPL-ACNC: 11.5 SEC — SIGNIFICANT CHANGE UP (ref 9.5–13)
RBC # BLD: 3.13 M/UL — LOW (ref 3.8–5.2)
RBC # BLD: 3.13 M/UL — LOW (ref 3.8–5.2)
RBC # FLD: 14.1 % — SIGNIFICANT CHANGE UP (ref 10.3–14.5)
RBC # FLD: 14.1 % — SIGNIFICANT CHANGE UP (ref 10.3–14.5)
RBC BLD AUTO: ABNORMAL
RBC BLD AUTO: ABNORMAL
SMUDGE CELLS # BLD: PRESENT — SIGNIFICANT CHANGE UP
SMUDGE CELLS # BLD: PRESENT — SIGNIFICANT CHANGE UP
SODIUM SERPL-SCNC: 138 MMOL/L — SIGNIFICANT CHANGE UP (ref 135–145)
SODIUM SERPL-SCNC: 138 MMOL/L — SIGNIFICANT CHANGE UP (ref 135–145)
TROPONIN T, HIGH SENSITIVITY RESULT: 8 NG/L — SIGNIFICANT CHANGE UP (ref 0–51)
TROPONIN T, HIGH SENSITIVITY RESULT: 8 NG/L — SIGNIFICANT CHANGE UP (ref 0–51)
WBC # BLD: 3.2 K/UL — LOW (ref 3.8–10.5)
WBC # BLD: 3.2 K/UL — LOW (ref 3.8–10.5)
WBC # FLD AUTO: 3.2 K/UL — LOW (ref 3.8–10.5)
WBC # FLD AUTO: 3.2 K/UL — LOW (ref 3.8–10.5)

## 2023-11-17 PROCEDURE — 93010 ELECTROCARDIOGRAM REPORT: CPT

## 2023-11-17 PROCEDURE — 99285 EMERGENCY DEPT VISIT HI MDM: CPT

## 2023-11-17 NOTE — ED PROVIDER NOTE - PATIENT PORTAL LINK FT
You can access the FollowMyHealth Patient Portal offered by [facility] by registering at the following website: [email or web address]. By joining [employer]’s FollowMyHealth portal, you will also be able to view your health information using other applications (apps) compatible with our system.

## 2023-11-17 NOTE — ED PROVIDER NOTE - PHYSICAL EXAMINATION
Const: Awake, alert and oriented. In no acute distress. Well appearing.  HEENT: NC/AT. Moist mucous membranes.  Eyes: No scleral icterus. EOMI.  Neck:. Soft and supple. Full ROM without pain.  Cardiac: Regular rate and rhythm. +S1/S2. No murmurs. Peripheral pulses 2+ and symmetric. No LE edema.  Resp: Speaking in full sentences. No evidence of respiratory distress. No wheezes, rales or rhonchi.  Abd: Soft, non-tender, non-distended. Normal bowel sounds in all 4 quadrants. No guarding or rebound.  Back: Spine midline and non-tender. No CVAT.  Neuro: no gross deficits, moving all extremities, normal speech  Skin: No rashes, abrasions or lacerations.

## 2023-11-17 NOTE — ED PROVIDER NOTE - OBJECTIVE STATEMENT
29 yo female hx of SLE and nephritis on cellcept and hydrozychloroquine; hx of PE discovered this summer; on eliquis; presents with 5 days of intermittent chest discomfot assoc with SOB with any exertion; saw cardiologist dr rueda this week for stress test and holter monitor; results pending; no calf pain; has seen heme in past, neg pro-coagulant work up; has stopped OCPs since PE diagnosis; no recent travel.

## 2023-11-17 NOTE — ED ADULT TRIAGE NOTE - SOURCE OF INFORMATION
Called and relayed result note from yesterday. (See TE 12/15/20 convey results)    See msg below.  Please advise. Thank you.   Pharmacy selected  Best number to reach: [phone]     Patient

## 2023-11-17 NOTE — ED ADULT TRIAGE NOTE - CHIEF COMPLAINT QUOTE
Pt c/o chest tightness for the last week. Went to the cardiologist yesterday and had a stress and echo done as well as having a Holter applied. She doesn't have the results back yet. She states that her symptoms are similar to when she had B/L PE's in august. Pt is currently on Eliquis twice a day.

## 2023-11-17 NOTE — ED PROVIDER NOTE - PROGRESS NOTE DETAILS
discussed by phone with dr rueda; confirms saw patient this week in office, had a negative exercise stress test; CTA negative for PE. Will dc.

## 2023-11-18 LAB
TROPONIN T, HIGH SENSITIVITY RESULT: 8 NG/L — SIGNIFICANT CHANGE UP (ref 0–51)
TROPONIN T, HIGH SENSITIVITY RESULT: 8 NG/L — SIGNIFICANT CHANGE UP (ref 0–51)

## 2023-11-18 PROCEDURE — 84484 ASSAY OF TROPONIN QUANT: CPT

## 2023-11-18 PROCEDURE — 85610 PROTHROMBIN TIME: CPT

## 2023-11-18 PROCEDURE — 71275 CT ANGIOGRAPHY CHEST: CPT | Mod: 26,MA

## 2023-11-18 PROCEDURE — 84702 CHORIONIC GONADOTROPIN TEST: CPT

## 2023-11-18 PROCEDURE — 99285 EMERGENCY DEPT VISIT HI MDM: CPT | Mod: 25

## 2023-11-18 PROCEDURE — 71275 CT ANGIOGRAPHY CHEST: CPT | Mod: MA

## 2023-11-18 PROCEDURE — 93005 ELECTROCARDIOGRAM TRACING: CPT

## 2023-11-18 PROCEDURE — 36415 COLL VENOUS BLD VENIPUNCTURE: CPT

## 2023-11-18 PROCEDURE — 85730 THROMBOPLASTIN TIME PARTIAL: CPT

## 2023-11-18 PROCEDURE — 85025 COMPLETE CBC W/AUTO DIFF WBC: CPT

## 2023-11-18 PROCEDURE — 83880 ASSAY OF NATRIURETIC PEPTIDE: CPT

## 2023-11-18 PROCEDURE — 80053 COMPREHEN METABOLIC PANEL: CPT

## 2023-11-18 NOTE — ED ADULT NURSE NOTE - OBJECTIVE STATEMENT
Pt A & Ox4 c/o chest pressure, discomfort and SOB on exertion. Respirations even and unlabored. Pt denies chest pain or SOB at this time. Pt + hx of PE, lupus and is on birth control. Pt concerned for possibility of return of blood clot. Pt on Eliquis BID. IV established, blood specimens sent to lab. Pt awaiting transport to [place]. WNL/intact

## 2023-12-22 ENCOUNTER — RX RENEWAL (OUTPATIENT)
Age: 28
End: 2023-12-22

## 2024-01-02 NOTE — HISTORY OF PRESENT ILLNESS
[FreeTextEntry1] : Pt presenting today for a f.u visit for SLE/ Sjogren's. Last seen 09/2023. Chart reviewed since LV. Currently on SC Benlysta, HCQ 200mg daily. Off prednisone now. Tolerating medication well. Denies side effects. Recently started of MMF 500mg BID for class V membranous lupus nephritis.  Also recently dx with multiple PE's on AC now.  Feels much better overall today.

## 2024-01-02 NOTE — ASSESSMENT
[FreeTextEntry1] : SLE/ Sjogrens- Labs with significantly elevated JADE, dsdna, hypocomplementemia, elevated inflammatory markers, leukopenia, proteinuria, SSA/SSB, RNP polymerase III  Now s/p renal biopsy with class V membranous lupus nephritis.  - repeat labs prior to follow up - c/w SC Benlysta - c/w HCQ 300mg daily based on her weight - off prednisone now - ophthalmology evaluation - will avoid MTX in child bearing age female - c/w MMF 1000mg BID. will optimize dose  - reviewed risk and benefits of medications - encouraged compliance with medications and follow up visits  Discussed treatment plan with the patient. The patient was given the opportunity to ask questions and all questions were answered to their satisfaction.

## 2024-02-07 ENCOUNTER — APPOINTMENT (OUTPATIENT)
Dept: NEPHROLOGY | Facility: CLINIC | Age: 29
End: 2024-02-07
Payer: COMMERCIAL

## 2024-02-07 ENCOUNTER — NON-APPOINTMENT (OUTPATIENT)
Age: 29
End: 2024-02-07

## 2024-02-07 VITALS
SYSTOLIC BLOOD PRESSURE: 116 MMHG | DIASTOLIC BLOOD PRESSURE: 66 MMHG | HEIGHT: 64 IN | WEIGHT: 126 LBS | HEART RATE: 94 BPM | OXYGEN SATURATION: 99 % | BODY MASS INDEX: 21.51 KG/M2

## 2024-02-07 PROCEDURE — 99214 OFFICE O/P EST MOD 30 MIN: CPT

## 2024-02-07 RX ORDER — SERTRALINE HYDROCHLORIDE 25 MG/1
25 TABLET, FILM COATED ORAL
Refills: 0 | Status: DISCONTINUED | COMMUNITY
End: 2024-02-07

## 2024-02-07 RX ORDER — SPIRONOLACTONE 25 MG/1
25 TABLET ORAL DAILY
Refills: 0 | Status: DISCONTINUED | COMMUNITY
End: 2024-02-07

## 2024-02-07 NOTE — ASSESSMENT
[FreeTextEntry1] :    Nephrotic syndrome UA with 300 protein- trace blood  Tp/cr ratio 4.4<- 7.2 gm <-- 6.6 Albumin levels 2.5-> 3.2  Class V lupus nephritis- biopsy proven Continue CellCept, hydroxychloroquine and Benlysta Repeat UA, protein creatinine ratio and renal panel Will start ACE inhibitor after reviewing results    PE Continue Eliquis  Anasarca resolved Off diuretics at this time  Return to clinic in 3 months

## 2024-02-07 NOTE — PHYSICAL EXAM
[General Appearance - Alert] : alert [General Appearance - In No Acute Distress] : in no acute distress [Sclera] : the sclera and conjunctiva were normal [Hearing Threshold Finger Rub Not [place]] : hearing was normal [Auscultation Breath Sounds / Voice Sounds] : lungs were clear to auscultation bilaterally [Heart Sounds] : normal S1 and S2 [Edema] : there was no peripheral edema [Abdomen Tenderness] : non-tender [No CVA Tenderness] : no ~M costovertebral angle tenderness [Abnormal Walk] : normal gait [] : no rash [No Focal Deficits] : no focal deficits [Oriented To Time, Place, And Person] : oriented to person, place, and time [Impaired Insight] : insight and judgment were intact

## 2024-02-07 NOTE — HISTORY OF PRESENT ILLNESS
[FreeTextEntry1] : 27 year old woman with PMH of SLE/ Sjogrens diagnosed last eyar in August- s/p benlysta now with improving symptoms. She is now on HCQ and Benlysta and follows with Dr. Hernandez. Pt referred to us for evaluation of proteinuria. pt denies hematuria, reports foamy urine which is improving also reports generalized edema back in October;was never prescribed diuretics Reports leg edema at the end of the day Denies NSAID use  SH; non smoker, no Etoh/ illicit drug abuse Works as dental hygienist  --------------------------------------------------------  08/02 Pt presents for follow up of proteinuria. pt seen and examined; feels well. She had called our office reporting 10 lbs weight gain and worsening abdominal and leg edema and was given Lasix 40 mg daily. pt reports leg edema has resolved but she continues to have mild abdominal fullness. Her weight is now 123 down from 130.  Pt is on Benlysta weekly and on  once daily- also on prednisone taper 10 mg daily. Urine is less foamy- she is scheduled for kidney biopsy on 08/10.  -------------------------------  10/25/23 Patient presents for follow-up of lupus nephritis.  She had a kidney biopsy done which showed class V membranous lupus nephritis. Also was admitted at the hospital with multiple PEs-now on Eliquis. Patient was started on CellCept 500 mg twice daily in August-dose increased to 1000 mg twice a day by rheumatologist. She continues to be on Benlysta subcutaneous, hydroxychloroquine 200 mg daily. Prednisone was tapered off She is currently off diuretics. Feels well denies any acute complaint. -------------------------------  02/07/2024 Patient presents for follow-up of class V lupus nephritis. Reports she feels well. Denies any recent illnesses/hospitalization She is currently on Benlysta 200 mg weekly,  hydroxychloroquine 300 mg daily and CellCept 1000 mg twice a day  Reports her urine is less foamy

## 2024-02-08 LAB
ALBUMIN SERPL ELPH-MCNC: 3.6 G/DL
ANION GAP SERPL CALC-SCNC: 11 MMOL/L
APPEARANCE: ABNORMAL
BACTERIA: NEGATIVE /HPF
BILIRUBIN URINE: NEGATIVE
BLOOD URINE: NEGATIVE
BUN SERPL-MCNC: 13 MG/DL
CALCIUM SERPL-MCNC: 9 MG/DL
CAST: 2 /LPF
CHLORIDE SERPL-SCNC: 107 MMOL/L
CO2 SERPL-SCNC: 26 MMOL/L
COLOR: NORMAL
CREAT SERPL-MCNC: 0.7 MG/DL
CREAT SPEC-SCNC: 324 MG/DL
CREAT/PROT UR: 1.7 RATIO
EGFR: 121 ML/MIN/1.73M2
EPITHELIAL CELLS: 6 /HPF
GLUCOSE QUALITATIVE U: NEGATIVE MG/DL
GLUCOSE SERPL-MCNC: 97 MG/DL
HYALINE CASTS: PRESENT
KETONES URINE: ABNORMAL MG/DL
LEUKOCYTE ESTERASE URINE: NEGATIVE
MICROSCOPIC-UA: NORMAL
NITRITE URINE: NEGATIVE
PH URINE: 5.5
PHOSPHATE SERPL-MCNC: 3.3 MG/DL
POTASSIUM SERPL-SCNC: 4.2 MMOL/L
PROT UR-MCNC: 544 MG/DL
PROTEIN URINE: >=1000 MG/DL
RED BLOOD CELLS URINE: NORMAL /HPF
REVIEW: NORMAL
SODIUM SERPL-SCNC: 144 MMOL/L
SPECIFIC GRAVITY URINE: >1.03
UROBILINOGEN URINE: 1 MG/DL
WHITE BLOOD CELLS URINE: 2 /HPF

## 2024-02-09 ENCOUNTER — NON-APPOINTMENT (OUTPATIENT)
Age: 29
End: 2024-02-09

## 2024-02-21 ENCOUNTER — NON-APPOINTMENT (OUTPATIENT)
Age: 29
End: 2024-02-21

## 2024-02-22 ENCOUNTER — NON-APPOINTMENT (OUTPATIENT)
Age: 29
End: 2024-02-22

## 2024-03-01 ENCOUNTER — LABORATORY RESULT (OUTPATIENT)
Age: 29
End: 2024-03-01

## 2024-03-05 LAB
ALBUMIN SERPL ELPH-MCNC: 3.8 G/DL
ANION GAP SERPL CALC-SCNC: 13 MMOL/L
BUN SERPL-MCNC: 14 MG/DL
CALCIUM SERPL-MCNC: 9.5 MG/DL
CHLORIDE SERPL-SCNC: 106 MMOL/L
CO2 SERPL-SCNC: 23 MMOL/L
CREAT SERPL-MCNC: 0.68 MG/DL
EGFR: 122 ML/MIN/1.73M2
GLUCOSE SERPL-MCNC: 85 MG/DL
PHOSPHATE SERPL-MCNC: 3.4 MG/DL
POTASSIUM SERPL-SCNC: 4.1 MMOL/L
SODIUM SERPL-SCNC: 142 MMOL/L

## 2024-03-06 ENCOUNTER — APPOINTMENT (OUTPATIENT)
Dept: RHEUMATOLOGY | Facility: CLINIC | Age: 29
End: 2024-03-06
Payer: COMMERCIAL

## 2024-03-06 VITALS
RESPIRATION RATE: 17 BRPM | DIASTOLIC BLOOD PRESSURE: 70 MMHG | WEIGHT: 125 LBS | OXYGEN SATURATION: 99 % | HEART RATE: 91 BPM | TEMPERATURE: 97.7 F | BODY MASS INDEX: 21.34 KG/M2 | SYSTOLIC BLOOD PRESSURE: 120 MMHG | HEIGHT: 64 IN

## 2024-03-06 PROCEDURE — G2211 COMPLEX E/M VISIT ADD ON: CPT

## 2024-03-06 PROCEDURE — 99215 OFFICE O/P EST HI 40 MIN: CPT

## 2024-03-06 RX ORDER — APIXABAN 5 MG/1
5 TABLET, FILM COATED ORAL
Refills: 0 | Status: DISCONTINUED | COMMUNITY
End: 2024-03-06

## 2024-03-07 NOTE — ASSESSMENT
[FreeTextEntry1] : SLE/ Sjogrens- Labs with significantly elevated JADE, dsdna, hypocomplementemia, elevated inflammatory markers, leukopenia, proteinuria, SSA/SSB, RNP polymerase III  Now s/p renal biopsy with class V membranous lupus nephritis.  - repeat labs in 2 weeks.  Will monitor neutropenia- related to medications? - c/w SC Benlysta - c/w HCQ 300mg daily based on her weight - off prednisone now - ophthalmology evaluation - will avoid MTX in child bearing age female - c/w MMF 1000mg BID  - reviewed risk and benefits of medications - encouraged compliance with medications and follow up visits  Discussed treatment plan with the patient. The patient was given the opportunity to ask questions and all questions were answered to their satisfaction.

## 2024-03-07 NOTE — HISTORY OF PRESENT ILLNESS
[FreeTextEntry1] : Pt presenting today for a f.u visit for SLE/ Sjogren's. Last seen 11/2023. Chart reviewed since LV. Currently on SC Benlysta, HCQ 200mg daily, MMF 1000mg BID. Off prednisone. Tolerating medication well. Denies side effects.  Feels much better overall today. Recent labs reviewed with pt- has slight neutropenia- can be related to medications??.  Denies fever, chills, recent infections, cough, SOB. No acute complaints.

## 2024-03-07 NOTE — PHYSICAL EXAM
[General Appearance - Alert] : alert [General Appearance - In No Acute Distress] : in no acute distress [General Appearance - Well Nourished] : well nourished [General Appearance - Well Developed] : well developed [Sclera] : the sclera and conjunctiva were normal [Neck Appearance] : the appearance of the neck was normal [Outer Ear] : the ears and nose were normal in appearance [Heart Sounds] : normal S1 and S2 [] : no rash [Musculoskeletal - Swelling] : no joint swelling seen [Oriented To Time, Place, And Person] : oriented to person, place, and time [No Focal Deficits] : no focal deficits

## 2024-03-27 ENCOUNTER — RX RENEWAL (OUTPATIENT)
Age: 29
End: 2024-03-27

## 2024-04-04 ENCOUNTER — LABORATORY RESULT (OUTPATIENT)
Age: 29
End: 2024-04-04

## 2024-04-09 ENCOUNTER — APPOINTMENT (OUTPATIENT)
Dept: RHEUMATOLOGY | Facility: CLINIC | Age: 29
End: 2024-04-09
Payer: COMMERCIAL

## 2024-04-09 DIAGNOSIS — R53.82 CHRONIC FATIGUE, UNSPECIFIED: ICD-10-CM

## 2024-04-09 DIAGNOSIS — M25.50 PAIN IN UNSPECIFIED JOINT: ICD-10-CM

## 2024-04-09 DIAGNOSIS — M35.00 SICCA SYNDROME, UNSPECIFIED: ICD-10-CM

## 2024-04-09 DIAGNOSIS — M32.9 SYSTEMIC LUPUS ERYTHEMATOSUS, UNSPECIFIED: ICD-10-CM

## 2024-04-09 DIAGNOSIS — D72.819 DECREASED WHITE BLOOD CELL COUNT, UNSPECIFIED: ICD-10-CM

## 2024-04-09 DIAGNOSIS — R59.1 GENERALIZED ENLARGED LYMPH NODES: ICD-10-CM

## 2024-04-09 DIAGNOSIS — M32.14 GLOMERULAR DISEASE IN SYSTEMIC LUPUS ERYTHEMATOSUS: ICD-10-CM

## 2024-04-09 DIAGNOSIS — R76.8 OTHER SPECIFIED ABNORMAL IMMUNOLOGICAL FINDINGS IN SERUM: ICD-10-CM

## 2024-04-09 DIAGNOSIS — I73.00 RAYNAUD'S SYNDROME W/OUT GANGRENE: ICD-10-CM

## 2024-04-09 DIAGNOSIS — Z79.899 OTHER LONG TERM (CURRENT) DRUG THERAPY: ICD-10-CM

## 2024-04-09 LAB
ALBUMIN SERPL ELPH-MCNC: 3.7 G/DL
ALP BLD-CCNC: 58 U/L
ALT SERPL-CCNC: 10 U/L
ANA SER IF-ACNC: NEGATIVE
ANION GAP SERPL CALC-SCNC: 14 MMOL/L
AST SERPL-CCNC: 20 U/L
BASOPHILS # BLD AUTO: 0.02 K/UL
BASOPHILS NFR BLD AUTO: 0.9 %
BILIRUB SERPL-MCNC: 0.2 MG/DL
BUN SERPL-MCNC: 15 MG/DL
C3 SERPL-MCNC: 145 MG/DL
C4 SERPL-MCNC: 44 MG/DL
CALCIUM SERPL-MCNC: 9.4 MG/DL
CHLORIDE SERPL-SCNC: 106 MMOL/L
CK SERPL-CCNC: 70 U/L
CO2 SERPL-SCNC: 21 MMOL/L
CREAT SERPL-MCNC: 0.67 MG/DL
CRP SERPL-MCNC: <3 MG/L
DSDNA AB SER-ACNC: 27 IU/ML
EGFR: 123 ML/MIN/1.73M2
EOSINOPHIL # BLD AUTO: 0.14 K/UL
EOSINOPHIL NFR BLD AUTO: 5.2 %
ERYTHROCYTE [SEDIMENTATION RATE] IN BLOOD BY WESTERGREN METHOD: 56 MM/HR
GLUCOSE SERPL-MCNC: 87 MG/DL
HCT VFR BLD CALC: 32.4 %
HGB BLD-MCNC: 9.9 G/DL
LYMPHOCYTES # BLD AUTO: 1.09 K/UL
LYMPHOCYTES NFR BLD AUTO: 39.6 %
MAN DIFF?: NORMAL
MCHC RBC-ENTMCNC: 27.3 PG
MCHC RBC-ENTMCNC: 30.6 GM/DL
MCV RBC AUTO: 89.3 FL
MONOCYTES # BLD AUTO: 0.28 K/UL
MONOCYTES NFR BLD AUTO: 10.3 %
NEUTROPHILS # BLD AUTO: 1.21 K/UL
NEUTROPHILS NFR BLD AUTO: 44 %
PLATELET # BLD AUTO: 356 K/UL
POTASSIUM SERPL-SCNC: 4.7 MMOL/L
PROT SERPL-MCNC: 6.4 G/DL
RBC # BLD: 3.63 M/UL
RBC # FLD: 12.9 %
SODIUM SERPL-SCNC: 141 MMOL/L
TSH SERPL-ACNC: 1 UIU/ML
WBC # FLD AUTO: 2.74 K/UL

## 2024-04-09 PROCEDURE — G2211 COMPLEX E/M VISIT ADD ON: CPT

## 2024-04-09 PROCEDURE — 99214 OFFICE O/P EST MOD 30 MIN: CPT

## 2024-04-09 NOTE — ASSESSMENT
[FreeTextEntry1] : SLE/ Sjogrens- Labs with significantly elevated JADE, dsdna, hypocomplementemia, elevated inflammatory markers, leukopenia, proteinuria, SSA/SSB, RNP polymerase III  Now s/p renal biopsy with class V membranous lupus nephritis.  - repeat labs prior to follow up.  - c/w SC Benlysta - c/w HCQ 300mg daily based on her weight - off prednisone now - ophthalmology evaluation - will avoid MTX in child bearing age female - c/w MMF 1000mg BID  - reviewed risk and benefits of medications - encouraged compliance with medications and follow up visits  Discussed treatment plan with the patient. The patient was given the opportunity to ask questions and all questions were answered to their satisfaction.

## 2024-04-09 NOTE — HISTORY OF PRESENT ILLNESS
[Home] : at home, [unfilled] , at the time of the visit. [Medical Office: ([facility])___] : at the medical office located in  [Verbal consent obtained from patient] : the patient, [unfilled] [FreeTextEntry1] : Pt presenting today for a f.u visit for SLE/ Sjogren's. Last seen 03/2024. Chart reviewed since LV. LV with low WBC count. Recent labs from 04/2024 reviewed with pt in detail- WBC count stable.  Currently on SC Benlysta, HCQ 200mg daily, MMF 1000mg BID. Off prednisone. Tolerating medication well. Denies side effects.  Feels well overall today. No acute complaints.  Denies fever, chills, recent infections, cough, SOB. No acute complaints. Had extensive discussion with pt today about medications. At this time we will c/w current medications and monitor her labs closely.

## 2024-04-22 ENCOUNTER — RX RENEWAL (OUTPATIENT)
Age: 29
End: 2024-04-22

## 2024-04-22 RX ORDER — MYCOPHENOLATE MOFETIL 500 MG/1
500 TABLET ORAL TWICE DAILY
Qty: 120 | Refills: 0 | Status: ACTIVE | COMMUNITY
Start: 2023-08-25 | End: 1900-01-01

## 2024-05-08 NOTE — ED PROVIDER NOTE - NS_EDPROVIDERDISPOUSERTYPE_ED_A_ED
Continue Regimen: If areas of eczema should redevelop he may resume use of halobetasol 0.05% ointment.  Refills sent today.
Detail Level: Zone
Attending Attestation (For Attendings USE Only)...

## 2024-05-22 ENCOUNTER — APPOINTMENT (OUTPATIENT)
Dept: NEPHROLOGY | Facility: CLINIC | Age: 29
End: 2024-05-22
Payer: COMMERCIAL

## 2024-05-22 VITALS — WEIGHT: 123 LBS | BODY MASS INDEX: 21 KG/M2 | HEART RATE: 101 BPM | HEIGHT: 64 IN | OXYGEN SATURATION: 99 %

## 2024-05-22 DIAGNOSIS — N04.9 NEPHROTIC SYNDROME WITH UNSPECIFIED MORPHOLOGIC CHANGES: ICD-10-CM

## 2024-05-22 PROCEDURE — 99213 OFFICE O/P EST LOW 20 MIN: CPT

## 2024-05-22 NOTE — PHYSICAL EXAM
[General Appearance - Alert] : alert [General Appearance - In No Acute Distress] : in no acute distress [Sclera] : the sclera and conjunctiva were normal [Hearing Threshold Finger Rub Not [place]] : hearing was normal [Auscultation Breath Sounds / Voice Sounds] : lungs were clear to auscultation bilaterally [Heart Sounds] : normal S1 and S2 [Edema] : there was no peripheral edema [Abdomen Tenderness] : non-tender [No CVA Tenderness] : no ~M costovertebral angle tenderness [Abnormal Walk] : normal gait [] : no rash [No Focal Deficits] : no focal deficits [Oriented To Time, Place, And Person] : oriented to person, place, and time [Impaired Insight] : insight and judgment were intact

## 2024-05-22 NOTE — HISTORY OF PRESENT ILLNESS
[FreeTextEntry1] : 27 year old woman with PMH of SLE/ Sjogrens diagnosed last eyar in August- s/p benlysta now with improving symptoms. She is now on HCQ and Benlysta and follows with Dr. Hernandez. Pt referred to us for evaluation of proteinuria. pt denies hematuria, reports foamy urine which is improving also reports generalized edema back in October;was never prescribed diuretics Reports leg edema at the end of the day Denies NSAID use  SH; non smoker, no Etoh/ illicit drug abuse Works as dental hygienist  --------------------------------------------------------  08/02 Pt presents for follow up of proteinuria. pt seen and examined; feels well. She had called our office reporting 10 lbs weight gain and worsening abdominal and leg edema and was given Lasix 40 mg daily. pt reports leg edema has resolved but she continues to have mild abdominal fullness. Her weight is now 123 down from 130.  Pt is on Benlysta weekly and on  once daily- also on prednisone taper 10 mg daily. Urine is less foamy- she is scheduled for kidney biopsy on 08/10.  -------------------------------  10/25/23 Patient presents for follow-up of lupus nephritis.  She had a kidney biopsy done which showed class V membranous lupus nephritis. Also was admitted at the hospital with multiple PEs-now on Eliquis. Patient was started on CellCept 500 mg twice daily in August-dose increased to 1000 mg twice a day by rheumatologist. She continues to be on Benlysta subcutaneous, hydroxychloroquine 200 mg daily. Prednisone was tapered off She is currently off diuretics. Feels well denies any acute complaint. -------------------------------  02/07/2024 Patient presents for follow-up of class V lupus nephritis. Reports she feels well. Denies any recent illnesses/hospitalization She is currently on Benlysta 200 mg weekly,  hydroxychloroquine 300 mg daily and CellCept 1000 mg twice a day  Reports her urine is less foamy --------------------------------------------------- 05/22/2024 Patient presents for follow-up of class V lupus nephritis. Reports she feels well. Lupus has been quiescent Currently on SC Benlysta, HCQ 200mg daily, MMF 1000mg BID. Off prednisone.

## 2024-05-22 NOTE — ASSESSMENT
[FreeTextEntry1] :   28 year old woman with Nephrotic syndrome UA with 300 protein- trace blood  Tp/cr ratio 1.7<-4.4<- 7.2 gm <-- 6.6 Albumin levels 2.5-> 3.2->3.8  sp kidney biopsy with Class V lupus nephritis Continue CellCept, hydroxychloroquine and Benlysta Repeat UA, protein creatinine ratio and renal panel Up titrate  ACE inhibitor after reviewing results   PE sp Eliquis  Anasarca resolved Off diuretics at this time  Return to clinic in 3 months

## 2024-05-29 LAB
APPEARANCE: CLEAR
BACTERIA: NEGATIVE /HPF
BILIRUBIN URINE: NEGATIVE
BLOOD URINE: NEGATIVE
CAST: 0 /LPF
COLOR: YELLOW
CREAT SPEC-SCNC: 240 MG/DL
CREAT SPEC-SCNC: 240 MG/DL
CREAT/PROT UR: 1.1 RATIO
EPITHELIAL CELLS: 4 /HPF
GLUCOSE QUALITATIVE U: NEGATIVE MG/DL
KETONES URINE: NEGATIVE MG/DL
LEUKOCYTE ESTERASE URINE: NEGATIVE
MICROALBUMIN 24H UR DL<=1MG/L-MCNC: 159.7 MG/DL
MICROALBUMIN/CREAT 24H UR-RTO: 665 MG/G
MICROSCOPIC-UA: NORMAL
NITRITE URINE: NEGATIVE
PH URINE: 6
PROT UR-MCNC: 266 MG/DL
PROTEIN URINE: 300 MG/DL
RED BLOOD CELLS URINE: 2 /HPF
SPECIFIC GRAVITY URINE: >1.03
UROBILINOGEN URINE: 0.2 MG/DL
WHITE BLOOD CELLS URINE: 1 /HPF

## 2024-05-29 RX ORDER — ENALAPRIL MALEATE 5 MG/1
5 TABLET ORAL
Qty: 30 | Refills: 3 | Status: ACTIVE | COMMUNITY
Start: 2024-02-08 | End: 1900-01-01

## 2024-05-30 RX ORDER — BELIMUMAB 200 MG/ML
200 SOLUTION SUBCUTANEOUS
Qty: 4 | Refills: 5 | Status: ACTIVE | COMMUNITY
Start: 2023-05-17 | End: 1900-01-01

## 2024-07-22 ENCOUNTER — RX RENEWAL (OUTPATIENT)
Age: 29
End: 2024-07-22

## 2024-08-08 ENCOUNTER — LABORATORY RESULT (OUTPATIENT)
Age: 29
End: 2024-08-08

## 2024-08-14 ENCOUNTER — APPOINTMENT (OUTPATIENT)
Dept: RHEUMATOLOGY | Facility: CLINIC | Age: 29
End: 2024-08-14
Payer: COMMERCIAL

## 2024-08-14 VITALS
HEART RATE: 91 BPM | TEMPERATURE: 98.3 F | OXYGEN SATURATION: 99 % | HEIGHT: 64 IN | SYSTOLIC BLOOD PRESSURE: 112 MMHG | DIASTOLIC BLOOD PRESSURE: 74 MMHG

## 2024-08-14 DIAGNOSIS — D72.819 DECREASED WHITE BLOOD CELL COUNT, UNSPECIFIED: ICD-10-CM

## 2024-08-14 DIAGNOSIS — R53.82 CHRONIC FATIGUE, UNSPECIFIED: ICD-10-CM

## 2024-08-14 DIAGNOSIS — M35.00 SICCA SYNDROME, UNSPECIFIED: ICD-10-CM

## 2024-08-14 DIAGNOSIS — I73.00 RAYNAUD'S SYNDROME W/OUT GANGRENE: ICD-10-CM

## 2024-08-14 DIAGNOSIS — R76.8 OTHER SPECIFIED ABNORMAL IMMUNOLOGICAL FINDINGS IN SERUM: ICD-10-CM

## 2024-08-14 DIAGNOSIS — Z79.899 OTHER LONG TERM (CURRENT) DRUG THERAPY: ICD-10-CM

## 2024-08-14 DIAGNOSIS — M25.50 PAIN IN UNSPECIFIED JOINT: ICD-10-CM

## 2024-08-14 DIAGNOSIS — M32.9 SYSTEMIC LUPUS ERYTHEMATOSUS, UNSPECIFIED: ICD-10-CM

## 2024-08-14 PROCEDURE — G2211 COMPLEX E/M VISIT ADD ON: CPT

## 2024-08-14 PROCEDURE — 99215 OFFICE O/P EST HI 40 MIN: CPT

## 2024-08-15 NOTE — END OF VISIT
[Time Spent: ___ minutes] : I have spent [unfilled] minutes of time on the encounter. [FreeTextEntry1] : See scan; as per HPI, all others negative

## 2024-08-15 NOTE — HISTORY OF PRESENT ILLNESS
[FreeTextEntry1] : Pt presenting today for a f.u visit for SLE/ Sjogren's. Last seen 04/2024. Chart reviewed since LV. Recent labs reviewed with pt - low WBC count.  Currently on SC Benlysta, HCQ 200mg daily, MMF 1000mg BID. Off prednisone. Tolerating medication well. Denies side effects.  Feels well overall today. No acute complaints.  Denies fever, chills, recent infections, cough, SOB. No acute complaints. Had extensive discussion with pt today about medications. At this time we will c/w current medications and monitor her labs closely.

## 2024-09-05 ENCOUNTER — APPOINTMENT (OUTPATIENT)
Dept: NEPHROLOGY | Facility: CLINIC | Age: 29
End: 2024-09-05

## 2024-09-05 VITALS
HEIGHT: 64 IN | SYSTOLIC BLOOD PRESSURE: 116 MMHG | OXYGEN SATURATION: 100 % | HEART RATE: 58 BPM | DIASTOLIC BLOOD PRESSURE: 72 MMHG | WEIGHT: 115 LBS | BODY MASS INDEX: 19.63 KG/M2

## 2024-09-05 DIAGNOSIS — M32.14 GLOMERULAR DISEASE IN SYSTEMIC LUPUS ERYTHEMATOSUS: ICD-10-CM

## 2024-09-05 DIAGNOSIS — M32.9 SYSTEMIC LUPUS ERYTHEMATOSUS, UNSPECIFIED: ICD-10-CM

## 2024-09-05 DIAGNOSIS — F41.9 ANXIETY DISORDER, UNSPECIFIED: ICD-10-CM

## 2024-09-05 PROCEDURE — 99213 OFFICE O/P EST LOW 20 MIN: CPT

## 2024-09-05 RX ORDER — BUPROPION HYDROCHLORIDE 150 MG/1
150 TABLET, EXTENDED RELEASE ORAL DAILY
Refills: 0 | Status: ACTIVE | COMMUNITY
Start: 2024-09-05

## 2024-09-05 NOTE — HISTORY OF PRESENT ILLNESS
[FreeTextEntry1] : 27 year old woman with PMH of SLE/ Sjogrens diagnosed last eyar in August- s/p benlysta now with improving symptoms. She is now on HCQ and Benlysta and follows with Dr. Hernandez. Pt referred to us for evaluation of proteinuria. 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 pt seen and examined; feels well. She had called our office reporting 10 lbs weight gain and worsening abdominal and leg edema and was given Lasix 40 mg daily. pt reports leg edema has resolved but she continues to have mild abdominal fullness. Her weight is now 123 down from 130.  Pt is on Benlysta weekly and on  once daily- also on prednisone taper 10 mg daily. Urine is less foamy- she is scheduled for kidney biopsy on 08/10.  -------------------------------------------------- 10/25/23 Patient presents for follow-up of lupus nephritis.  She had a kidney biopsy done which showed class V membranous lupus nephritis. Also was admitted at the hospital with multiple PEs-now on Eliquis. Patient was started on CellCept 500 mg twice daily in August-dose increased to 1000 mg twice a day by rheumatologist. She continues to be on Benlysta subcutaneous, hydroxychloroquine 200 mg daily. Prednisone was tapered off She is currently off diuretics. Feels well denies any acute complaint. ------------------------------------------------- 02/07/2024 Patient presents for follow-up of class V lupus nephritis. Reports she feels well. Denies any recent illnesses/hospitalization She is currently on Benlysta 200 mg weekly,  hydroxychloroquine 300 mg daily and CellCept 1000 mg twice a day  Reports her urine is less foamy --------------------------------------------------- 05/22/2024 Patient presents for follow-up of class V lupus nephritis. Reports she feels well except occasional joint pains- Currently on SC Benlysta, HCQ 200mg daily, MMF 1000mg BID. Off prednisone.     ---------------------------------------------------  09/05/2024 Patient presents for follow-up of class V lupus nephritis. Reports she feels well. Lupus has been quiescent Currently on SC Benlysta, HCQ 200mg daily, MMF 1000mg BID. Off prednisone. '  reports her appetite has been low since she was started on Wellbutryn

## 2024-09-05 NOTE — PHYSICAL EXAM
[General Appearance - Alert] : alert [General Appearance - In No Acute Distress] : in no acute distress [Sclera] : the sclera and conjunctiva were normal [Hearing Threshold Finger Rub Not [place]] : hearing was normal [Auscultation Breath Sounds / Voice Sounds] : lungs were clear to auscultation bilaterally [Heart Sounds] : normal S1 and S2 [Edema] : there was no peripheral edema [Abdomen Tenderness] : non-tender [No CVA Tenderness] : no ~M costovertebral angle tenderness [] : no rash [Abnormal Walk] : normal gait [No Focal Deficits] : no focal deficits [Oriented To Time, Place, And Person] : oriented to person, place, and time [Impaired Insight] : insight and judgment were intact

## 2024-09-05 NOTE — ASSESSMENT
[FreeTextEntry1] :   28 year old woman with Nephrotic syndrome UA with 300 protein- trace blood  Tp/cr ratio 1.1<-1.7<-4.4<- 7.2 gm <-- 6.6 Albumin levels 2.5-> 3.2->3.8  sp kidney biopsy with Class V lupus nephritis Continue CellCept, hydroxychloroquine and Benlysta Currently on Enalapril 5 mg daily repeat UA, Tp/cr ratio  PE sp Eliquis  Anasarca resolved Off diuretics at this time  Return to clinic in 4 months

## 2024-09-06 LAB
APPEARANCE: CLEAR
BACTERIA: NEGATIVE /HPF
BILIRUBIN URINE: NEGATIVE
BLOOD URINE: ABNORMAL
CAST: 0 /LPF
COLOR: YELLOW
CREAT SPEC-SCNC: 152 MG/DL
CREAT/PROT UR: 0.9 RATIO
EPITHELIAL CELLS: 4 /HPF
GLUCOSE QUALITATIVE U: NEGATIVE MG/DL
KETONES URINE: NEGATIVE MG/DL
LEUKOCYTE ESTERASE URINE: NEGATIVE
MICROSCOPIC-UA: NORMAL
NITRITE URINE: NEGATIVE
PH URINE: 6.5
PROT UR-MCNC: 139 MG/DL
PROTEIN URINE: 300 MG/DL
RED BLOOD CELLS URINE: 2 /HPF
SPECIFIC GRAVITY URINE: 1.02
UROBILINOGEN URINE: 0.2 MG/DL
WHITE BLOOD CELLS URINE: 0 /HPF

## 2024-09-23 NOTE — ED PROVIDER NOTE - PRINCIPAL DIAGNOSIS
(Note: For Metoprolol, please specify Tartrate or Succinate)    Date of last visit with Cardiologist:  05/17/2024  [facility]  Supply requested (30/60/90 day): 30  Medication Requested: rivaroxaban (Xarelto) 20 MG Tab   Dosage (mcg/mg/g): 20 mg   Frequency (daily, twice daily, etc.): Take 1 tablet by mouth daily   Number of tablets remaining as of today: 0   Abdominal cramps

## 2024-10-07 ENCOUNTER — RX RENEWAL (OUTPATIENT)
Age: 29
End: 2024-10-07

## 2024-11-13 ENCOUNTER — APPOINTMENT (OUTPATIENT)
Dept: RHEUMATOLOGY | Facility: CLINIC | Age: 29
End: 2024-11-13

## 2025-01-01 ENCOUNTER — NON-APPOINTMENT (OUTPATIENT)
Age: 30
End: 2025-01-01

## 2025-01-09 ENCOUNTER — APPOINTMENT (OUTPATIENT)
Dept: NEPHROLOGY | Facility: CLINIC | Age: 30
End: 2025-01-09

## 2025-03-07 ENCOUNTER — EMERGENCY (EMERGENCY)
Facility: HOSPITAL | Age: 30
LOS: 1 days | Discharge: HOME | End: 2025-03-07
Attending: EMERGENCY MEDICINE
Payer: COMMERCIAL

## 2025-03-07 VITALS
TEMPERATURE: 98 F | RESPIRATION RATE: 18 BRPM | HEART RATE: 110 BPM | OXYGEN SATURATION: 100 % | SYSTOLIC BLOOD PRESSURE: 142 MMHG | DIASTOLIC BLOOD PRESSURE: 92 MMHG | WEIGHT: 121.03 LBS

## 2025-03-07 DIAGNOSIS — N63.10 UNSPECIFIED LUMP IN THE RIGHT BREAST, UNSPECIFIED QUADRANT: Chronic | ICD-10-CM

## 2025-03-07 LAB
ANISOCYTOSIS BLD QL: SLIGHT — SIGNIFICANT CHANGE UP
APTT BLD: 29.3 SEC — SIGNIFICANT CHANGE UP (ref 24.5–35.6)
BASOPHILS # BLD AUTO: 0.01 K/UL — SIGNIFICANT CHANGE UP (ref 0–0.2)
BASOPHILS # BLD MANUAL: 0.03 K/UL — SIGNIFICANT CHANGE UP (ref 0–0.2)
BASOPHILS NFR BLD AUTO: 0.3 % — SIGNIFICANT CHANGE UP (ref 0–2)
BASOPHILS NFR BLD MANUAL: 0.9 % — SIGNIFICANT CHANGE UP (ref 0–2)
ELLIPTOCYTES BLD QL SMEAR: ABNORMAL
EOSINOPHIL # BLD AUTO: 0.03 K/UL — SIGNIFICANT CHANGE UP (ref 0–0.5)
EOSINOPHIL # BLD MANUAL: 0.08 K/UL — SIGNIFICANT CHANGE UP (ref 0–0.5)
EOSINOPHIL NFR BLD AUTO: 1 % — SIGNIFICANT CHANGE UP (ref 0–6)
EOSINOPHIL NFR BLD MANUAL: 2.6 % — SIGNIFICANT CHANGE UP (ref 0–6)
GIANT PLATELETS BLD QL SMEAR: PRESENT
HCT VFR BLD CALC: 33.3 % — LOW (ref 34.5–45)
HGB BLD-MCNC: 10.6 G/DL — LOW (ref 11.5–15.5)
HYPOCHROMIA BLD QL: SLIGHT — SIGNIFICANT CHANGE UP
IMM GRANULOCYTES # BLD AUTO: 0 K/UL — SIGNIFICANT CHANGE UP (ref 0–0.07)
IMM GRANULOCYTES NFR BLD AUTO: 0 % — SIGNIFICANT CHANGE UP (ref 0–0.9)
INR BLD: 0.97 RATIO — SIGNIFICANT CHANGE UP (ref 0.85–1.16)
LYMPHOCYTES # BLD AUTO: 1.18 K/UL — SIGNIFICANT CHANGE UP (ref 1–3.3)
LYMPHOCYTES # BLD MANUAL: 1.24 K/UL — SIGNIFICANT CHANGE UP (ref 1–3.3)
LYMPHOCYTES NFR BLD AUTO: 38.2 % — SIGNIFICANT CHANGE UP (ref 13–44)
LYMPHOCYTES NFR BLD MANUAL: 40 % — SIGNIFICANT CHANGE UP (ref 13–44)
MANUAL NEUTROPHIL BANDS #: 0.03 K/UL — SIGNIFICANT CHANGE UP (ref 0–0.84)
MANUAL REACTIVE LYMPHOCYTES #: 0.24 K/UL — SIGNIFICANT CHANGE UP (ref 0–0.63)
MCHC RBC-ENTMCNC: 28.3 PG — SIGNIFICANT CHANGE UP (ref 27–34)
MCHC RBC-ENTMCNC: 31.8 G/DL — LOW (ref 32–36)
MCV RBC AUTO: 89 FL — SIGNIFICANT CHANGE UP (ref 80–100)
MICROCYTES BLD QL: SLIGHT — SIGNIFICANT CHANGE UP
MONOCYTES # BLD AUTO: 0.36 K/UL — SIGNIFICANT CHANGE UP (ref 0–0.9)
MONOCYTES # BLD MANUAL: 0.19 K/UL — SIGNIFICANT CHANGE UP (ref 0–0.9)
MONOCYTES NFR BLD AUTO: 11.7 % — SIGNIFICANT CHANGE UP (ref 2–14)
MONOCYTES NFR BLD MANUAL: 6.1 % — SIGNIFICANT CHANGE UP (ref 2–14)
NEUTROPHILS # BLD AUTO: 1.51 K/UL — LOW (ref 1.8–7.4)
NEUTROPHILS # BLD MANUAL: 1.29 K/UL — LOW (ref 1.8–7.4)
NEUTROPHILS NFR BLD AUTO: 48.8 % — SIGNIFICANT CHANGE UP (ref 43–77)
NEUTROPHILS NFR BLD MANUAL: 41.7 % — LOW (ref 43–77)
NEUTS BAND # BLD: 0.9 % — SIGNIFICANT CHANGE UP (ref 0–8)
NEUTS BAND NFR BLD: 0.9 % — SIGNIFICANT CHANGE UP (ref 0–8)
NRBC # BLD AUTO: 0 K/UL — SIGNIFICANT CHANGE UP (ref 0–0)
NRBC # FLD: 0 K/UL — SIGNIFICANT CHANGE UP (ref 0–0)
NRBC BLD AUTO-RTO: 0 /100 WBCS — SIGNIFICANT CHANGE UP (ref 0–0)
OVALOCYTES BLD QL SMEAR: SLIGHT — SIGNIFICANT CHANGE UP
PLAT MORPH BLD: NORMAL — SIGNIFICANT CHANGE UP
PLATELET # BLD AUTO: 272 K/UL — SIGNIFICANT CHANGE UP (ref 150–400)
PMV BLD: 10.7 FL — SIGNIFICANT CHANGE UP (ref 7–13)
POIKILOCYTOSIS BLD QL AUTO: SLIGHT — SIGNIFICANT CHANGE UP
PROTHROM AB SERPL-ACNC: 11.3 SEC — SIGNIFICANT CHANGE UP (ref 9.9–13.4)
RBC # BLD: 3.74 M/UL — LOW (ref 3.8–5.2)
RBC # FLD: 12.4 % — SIGNIFICANT CHANGE UP (ref 10.3–14.5)
RBC BLD AUTO: ABNORMAL
VARIANT LYMPHS # BLD: 7.8 % — HIGH (ref 0–6)
VARIANT LYMPHS NFR BLD MANUAL: 7.8 % — HIGH (ref 0–6)
WBC # BLD: 3.09 K/UL — LOW (ref 3.8–10.5)
WBC # FLD AUTO: 3.09 K/UL — LOW (ref 3.8–10.5)

## 2025-03-07 PROCEDURE — 71046 X-RAY EXAM CHEST 2 VIEWS: CPT | Mod: 26

## 2025-03-07 PROCEDURE — 93010 ELECTROCARDIOGRAM REPORT: CPT

## 2025-03-07 PROCEDURE — 99285 EMERGENCY DEPT VISIT HI MDM: CPT

## 2025-03-07 NOTE — ED PROVIDER NOTE - PROGRESS NOTE DETAILS
Estinfa: Patient received in sign-out from Dr. Evans pending CT scan in female with hx of lupus,  tachycardia and PE with persistent chest discomfort. Patient reports feeling better. labs wnl. initial K level likely hemolyzed. Currently asymptomatic. CTA chest negative for PE. VS stable. Patient can be discharged with PCP follow up for reevaluation.

## 2025-03-07 NOTE — ED PROVIDER NOTE - OBJECTIVE STATEMENT
Lilliana is a 27 year old female with a PMH of SLE, lupus nephritis, and PE with a chief concern of chest pain over the past week. She was seen by her primary care provider yesterday in which the most significant finding was a K+ of 6. She reports that this chest pain increases in intensity with exertion in addition to feeling like her heart is racing and shortness of breath.  She recently returned from a trip to the  within the past month. She denies any leg pain, chest pain specifically upon inspiration, fever, numbness, tingling, nausea, cough, & vomiting. She is currently taking metropolol & hydroxycholoroquine 200mg and was taking apixaban 5mg but stopped it.

## 2025-03-07 NOTE — ED PROVIDER NOTE - CLINICAL SUMMARY MEDICAL DECISION MAKING FREE TEXT BOX
Lilliana is a 27 year old female with a PMH of SLE, lupus nephritis, and PE with a chief concern of chest pain over the past week. The pain has progressed to pain upon exertion and considering her PMH of a PE her presentation is most concerning for another. She will receive a CT angiography in addition to HCG, troponin, & CBC.

## 2025-03-07 NOTE — ED PROVIDER NOTE - ATTENDING CONTRIBUTION TO CARE
Lilliana is a 27 year old female with a PMH of SLE, lupus nephritis, and PE with a chief concern of chest pain over the past week. She was seen by her primary care provider yesterday in which the most significant finding was a K+ of 6. She reports that this chest pain increases in intensity with exertion in addition to feeling like her heart is racing and shortness of breath.  She recently returned from a trip to the  within the past month. She denies any leg pain, chest pain specifically upon inspiration, fever, numbness, tingling, nausea, cough, & vomiting. She is currently taking metropolol & hydroxycholoroquine 200mg and was taking apixaban 5mg but stopped it.    Patient well-appearing on examination.  Noted to be slightly tachycardic but states that she has that at baseline.  No distress noted.  Normal pulmonary examination.  Given risk factors and Wells score greater than 4 will obtain CTA to rule out PE as cause of symptoms, troponin, BNP, symptom control and reevaluate.  If workup is unremarkable will clear patient for discharge home with outpatient cardiology and PMD follow-up

## 2025-03-07 NOTE — ED PROVIDER NOTE - PATIENT PORTAL LINK FT
You can access the FollowMyHealth Patient Portal offered by [facility] by registering at the following website: [email or web address]. By joining [facility]’s FollowMyHealth portal, you will also be able to view your health information using other applications (apps) compatible with our system.

## 2025-03-07 NOTE — ED ADULT NURSE NOTE - OBJECTIVE STATEMENT
Assumed care of pt at 2320. Pt is A&Ox4. Respirations are even and unlabored. Pt present to the ED for chest pain for 2 weeks. Pt states her potassium outpatient was 6. Hx of lupus. Pt comes to ED for eval. Pt sinus tach on cardiac monitor, 101bpm and stating 100% on room air.

## 2025-03-07 NOTE — ED ADULT NURSE NOTE - NSFALLUNIVINTERV_ED_ALL_ED
Bed/Stretcher in lowest position, wheels locked, appropriate side rails in place/Call bell, personal items and telephone in reach/Instruct patient to call for assistance before getting out of bed/chair/stretcher/Non-slip footwear applied when patient is off stretcher/Jud to call system/Physically safe environment - no spills, clutter or unnecessary equipment/Purposeful proactive rounding/Room/bathroom lighting operational, light cord in reach

## 2025-03-08 VITALS
TEMPERATURE: 98 F | RESPIRATION RATE: 14 BRPM | OXYGEN SATURATION: 100 % | HEART RATE: 92 BPM | DIASTOLIC BLOOD PRESSURE: 79 MMHG | SYSTOLIC BLOOD PRESSURE: 112 MMHG

## 2025-03-08 LAB
ALBUMIN SERPL ELPH-MCNC: 4.2 G/DL — SIGNIFICANT CHANGE UP (ref 3.3–5.2)
ALP SERPL-CCNC: 58 U/L — SIGNIFICANT CHANGE UP (ref 40–120)
ALT FLD-CCNC: 10 U/L — SIGNIFICANT CHANGE UP
ANION GAP SERPL CALC-SCNC: 14 MMOL/L — SIGNIFICANT CHANGE UP (ref 5–17)
AST SERPL-CCNC: 20 U/L — SIGNIFICANT CHANGE UP
BILIRUB SERPL-MCNC: <0.2 MG/DL — LOW (ref 0.4–2)
BUN SERPL-MCNC: 13.7 MG/DL — SIGNIFICANT CHANGE UP (ref 8–20)
CALCIUM SERPL-MCNC: 9.2 MG/DL — SIGNIFICANT CHANGE UP (ref 8.4–10.5)
CHLORIDE SERPL-SCNC: 105 MMOL/L — SIGNIFICANT CHANGE UP (ref 96–108)
CO2 SERPL-SCNC: 22 MMOL/L — SIGNIFICANT CHANGE UP (ref 22–29)
CREAT SERPL-MCNC: 0.75 MG/DL — SIGNIFICANT CHANGE UP (ref 0.5–1.3)
EGFR: 111 ML/MIN/1.73M2 — SIGNIFICANT CHANGE UP
GLUCOSE SERPL-MCNC: 91 MG/DL — SIGNIFICANT CHANGE UP (ref 70–99)
HCG SERPL-ACNC: <4 MIU/ML — SIGNIFICANT CHANGE UP
NT-PROBNP SERPL-SCNC: <36 PG/ML — SIGNIFICANT CHANGE UP (ref 0–300)
POTASSIUM SERPL-MCNC: 3.6 MMOL/L — SIGNIFICANT CHANGE UP (ref 3.5–5.3)
POTASSIUM SERPL-SCNC: 3.6 MMOL/L — SIGNIFICANT CHANGE UP (ref 3.5–5.3)
PROT SERPL-MCNC: 6.9 G/DL — SIGNIFICANT CHANGE UP (ref 6.6–8.7)
SODIUM SERPL-SCNC: 141 MMOL/L — SIGNIFICANT CHANGE UP (ref 135–145)
TROPONIN T, HIGH SENSITIVITY RESULT: <6 NG/L — SIGNIFICANT CHANGE UP (ref 0–51)

## 2025-03-08 PROCEDURE — 84702 CHORIONIC GONADOTROPIN TEST: CPT

## 2025-03-08 PROCEDURE — 84484 ASSAY OF TROPONIN QUANT: CPT

## 2025-03-08 PROCEDURE — 93005 ELECTROCARDIOGRAM TRACING: CPT

## 2025-03-08 PROCEDURE — 85610 PROTHROMBIN TIME: CPT

## 2025-03-08 PROCEDURE — 80053 COMPREHEN METABOLIC PANEL: CPT

## 2025-03-08 PROCEDURE — 71275 CT ANGIOGRAPHY CHEST: CPT | Mod: 26

## 2025-03-08 PROCEDURE — 36415 COLL VENOUS BLD VENIPUNCTURE: CPT

## 2025-03-08 PROCEDURE — 71275 CT ANGIOGRAPHY CHEST: CPT | Mod: MC

## 2025-03-08 PROCEDURE — 99285 EMERGENCY DEPT VISIT HI MDM: CPT | Mod: 25

## 2025-03-08 PROCEDURE — 85025 COMPLETE CBC W/AUTO DIFF WBC: CPT

## 2025-03-08 PROCEDURE — 71046 X-RAY EXAM CHEST 2 VIEWS: CPT

## 2025-03-08 PROCEDURE — 83880 ASSAY OF NATRIURETIC PEPTIDE: CPT

## 2025-03-08 PROCEDURE — 85730 THROMBOPLASTIN TIME PARTIAL: CPT

## 2025-05-21 ENCOUNTER — RX RENEWAL (OUTPATIENT)
Age: 30
End: 2025-05-21

## 2025-05-21 RX ORDER — ENALAPRIL MALEATE 2.5 MG/1
2.5 TABLET ORAL
Qty: 30 | Refills: 2 | Status: ACTIVE | COMMUNITY
Start: 2025-05-21 | End: 1900-01-01

## 2025-06-02 ENCOUNTER — RX RENEWAL (OUTPATIENT)
Age: 30
End: 2025-06-02

## 2025-06-03 ENCOUNTER — RX RENEWAL (OUTPATIENT)
Age: 30
End: 2025-06-03

## 2025-06-09 ENCOUNTER — RX RENEWAL (OUTPATIENT)
Age: 30
End: 2025-06-09

## 2025-07-11 NOTE — ED ADULT NURSE NOTE - NS ED NOTE ABUSE RESPONSE YN
Pre-Op Call IV Protocol     Have you ever had any difficulty with IV insertions in the past? Yes   If Yes, document difficult IV:  SB COMMENTS    Pre-Op Call Operative Patient Instructions     Name/Date/Time person receiving instructions:   PATIENT   CONTACTS       PRE-OP CALL  Please be aware there are 2 different locations.   Pavilion: Parking is available in [address] Garage [address] on [address] [place] [address] & [facility].  We recommend entering from the Pedestrian walkway bridge located on the 2nd floor of Parking [address]. The 2nd floor doors do not open until 5am. The Outpatient Pavilion main lobby entrance does not open until 6am.  is also available on the main entrance ground floor of the Outpatient Pavilion on [facility]. during the hours of 6am-2pm. Please check in with the  Desk right off the 2nd floor Pedestrian walkway entrance.  Hospital:  Please arrive at Entrance F on [facility]. The doors at Entrance F do not open until 5am. Parking access is located across the street from Entrance F in Parking Lot E. If closer access is needed for drop off, your  can drop you off at the door by taking the ramp at the right at Entrance F. Please be aware that there is no parking at the drop off area. Check in with guest service at the  at Entrance F, and you will be given instructions from there to Hospital Surgery.  Two family members or friends who are over the age of 18 may accompany you. No children under the age of 18 are permitted for visiting.    When you go home, you will not be able to drive or take public transportation alone (ie.bus/taxi/uber). You will need an adult (age 18 or older) to take you home or travel with you.  If you have sedation/anesthesia, a responsible adult (age 18 or older) will need to be with you to get instructions for what you will need to do at home.  You will need a responsible adult to stay with you for 24 hours after the surgery.  If you do not have  sedation/anesthesia, we must be able to contact someone by phone to have them pick you up.  We will need to have their contact information when you check in.  Your surgery will be cancelled if these arrangements have not been made.  (list name of person accompanying patient home if known) - DISCHARGE PLANNING  Children under 18 years old MUST have a parent/guardian with them at all times for the duration of their surgery.  If you become ill prior to surgery (ie. fever, vomiting), please notify your surgeon immediately.  Please bring and adhere to the following on the Day of Surgery:  Insurance Card and Referral (if required), 's license or photo id  Your medication list  Advance directives (living dempsey, healthcare power of )  All information on your pacemaker of AICD, if appropriate  Any other forms, imaging studies (x-rays/MRI/CT) the doctor may have given to you  Any medical devices (ie, crutches, brace, sling)  If you use a Cpap machine and are staying overnight please bring it with you and know your settings.  If you use a rescue inhaler for asthma, please bring it in to the hospital.  Keep personal items to a minimum. Suggested items to bring include toothbrush and toiletries if spending the night after surgery.  Loose fitting clothing and walking shoes if applicable  For comfort measures, you may bring headphones/music device/magazines that can be given to family when you go into the operating room.  ON THE DAY OF SURGERY: Do not wear contact lenses, nail polish, jewelry, lotion, or deodorant. Shampoo your hair the evening before your procedure. Do not use any conditioners, gel or hairspray. Do not apply any makeup, facial creams or lotions to your face the day of surgery.  Leave all valuables at home except what you will need or are instructed to bring.  Additional reminders for Pediatric patients:   Your child can wear their pajamas and bring a favorite toy/blanket/game with them.  No jewelry  please.   Formula for feeding after surgery or favorite sippy cup.   Please bring diapers or a change of underpants for young children.    NPO/Eating Drinking restrictions: Please note: If these guidelines are not followed, your procedure will be delayed and possibly cancelled.     Arrival time:    PRE-OP CALL QUESTIONS  Note to RN: For cases starting on or after 3pm consult the anesthesia department for NPO status.   NPO/Eating Drinking Restrictions prior to arrival time: for Adults/Pediatrics     Non Diabetics: Acceptable clear Liquids for adult and pediatric patients 1 hour prior to arrival time:   Water  Clear Electrolyte-replenishing drinks such as Pedialyte, Gatorade, Powerade, or Propel   (NO yogurt or smoothies or energy drinks)  Clear fruit juices, such as Apple juice without fiber (non-organic), cranberry juice and grape juice (NO pulp)  7-up/ Sprite  Black coffee or tea (NO additives which includes milk, creamer, sweeteners, honey, lemon)   Clear chicken or beef broth or bouillon. (NO homemade broth)   Diabetics: Acceptable clear Liquids for adult and pediatric patients 1 hours prior to arrival time:   Any of the items listed above ONLY if they are sugar free clear drinks.   Unacceptable Clear Liquids for ALL adult and pediatric patients:   Coffee or Tea with milk products or lemon  Orange juice  Alcohol   For Pediatric Patients Only: Breast Milk/Infant Formula and non-fat/protein meals (ie. cow/almond/soy milk) Restrictions:  Breast Milk is allowed 2 hours prior to arrival. Infant formula and non-fat protein meals (ie. cow/almond/soy milk) is allowed up to 4 hours prior to arrival.  DO NOT ADD anything such as cereal to these or surgery may be delayed or canceled.     Do NOT eat or consume any food or dairy after midnight. This includes candy and gum  Do NOT smoke, drink alcohol, or use recreational drugs prior to your surgery.    Glp-1: CHECK MEDS TO HOLD FOR ADDITIONAL DIETARY RESTRICTIONS FOR GLP-1.     Patient verbalized understanding? N/A    TAKE the following medications the morning of surgery with a small sip of water: HOME MEDICATIONS    ARRIVAL TIME TEXT MESSAGING:  I do not know your arrival time yet.  We will be sending you a text with this information when we have it along with an electronic copy of the instructions I just gave you. (Reinforce with GLP-1 patients to follow verbal instructions provided for clear liquids not what is on the text message) Please confirm CONTACTS is the phone number you would like this text to be sent. When you receive the text, Please click the link to confirm you have received it.     Please call the location of surgery with questions:   Outpatient Pavilion:  Monday-Friday 5am-5pm: [phone],  After hours: [phone]    Hospital   Monday-Friday: 5am-5pm [phone], After hours: [phone]    Yes